# Patient Record
Sex: FEMALE | Race: WHITE | Employment: OTHER | URBAN - NONMETROPOLITAN AREA
[De-identification: names, ages, dates, MRNs, and addresses within clinical notes are randomized per-mention and may not be internally consistent; named-entity substitution may affect disease eponyms.]

---

## 2021-01-19 ENCOUNTER — HOSPITAL ENCOUNTER (EMERGENCY)
Age: 26
Discharge: HOME | End: 2021-01-19
Payer: MEDICAID

## 2021-01-19 VITALS
RESPIRATION RATE: 16 BRPM | WEIGHT: 113 LBS | OXYGEN SATURATION: 100 % | HEIGHT: 70 IN | HEART RATE: 54 BPM | DIASTOLIC BLOOD PRESSURE: 74 MMHG | TEMPERATURE: 98.1 F | SYSTOLIC BLOOD PRESSURE: 108 MMHG | BODY MASS INDEX: 16.18 KG/M2

## 2021-01-19 DIAGNOSIS — Z20.2 EXPOSURE TO STD: Primary | ICD-10-CM

## 2021-01-19 LAB
BACTERIA: NORMAL
BILIRUBIN URINE: NEGATIVE
BLOOD, URINE: NEGATIVE
CASTS: NORMAL /LPF
CASTS: NORMAL /LPF
CHARACTER, URINE: CLEAR
COLOR: YELLOW
CRYSTALS: NORMAL
EPITHELIAL CELLS, UA: NORMAL /HPF
GLUCOSE, URINE: NEGATIVE MG/DL
KETONES, URINE: NEGATIVE
KOH PREP: NORMAL
LEUKOCYTE ESTERASE, URINE: NEGATIVE
MISCELLANEOUS LAB TEST RESULT: NORMAL
NITRITE, URINE: NEGATIVE
PH UA: 7 (ref 5–9)
PREGNANCY, URINE: NEGATIVE
PROTEIN UA: NEGATIVE MG/DL
RBC URINE: NORMAL /HPF
RENAL EPITHELIAL, UA: NORMAL
SPECIFIC GRAVITY UA: 1.02 (ref 1–1.03)
TRICHOMONAS PREP: NORMAL
UROBILINOGEN, URINE: 1 EU/DL (ref 0–1)
WBC UA: NORMAL /HPF
YEAST: NORMAL

## 2021-01-19 PROCEDURE — 87205 SMEAR GRAM STAIN: CPT

## 2021-01-19 PROCEDURE — 81025 URINE PREGNANCY TEST: CPT

## 2021-01-19 PROCEDURE — 87591 N.GONORRHOEAE DNA AMP PROB: CPT

## 2021-01-19 PROCEDURE — 99284 EMERGENCY DEPT VISIT MOD MDM: CPT

## 2021-01-19 PROCEDURE — 87070 CULTURE OTHR SPECIMN AEROBIC: CPT

## 2021-01-19 PROCEDURE — 87220 TISSUE EXAM FOR FUNGI: CPT

## 2021-01-19 PROCEDURE — 81001 URINALYSIS AUTO W/SCOPE: CPT

## 2021-01-19 PROCEDURE — 87210 SMEAR WET MOUNT SALINE/INK: CPT

## 2021-01-19 PROCEDURE — 6360000002 HC RX W HCPCS: Performed by: PHYSICIAN ASSISTANT

## 2021-01-19 PROCEDURE — 96372 THER/PROPH/DIAG INJ SC/IM: CPT

## 2021-01-19 PROCEDURE — 87491 CHLMYD TRACH DNA AMP PROBE: CPT

## 2021-01-19 PROCEDURE — 6370000000 HC RX 637 (ALT 250 FOR IP): Performed by: PHYSICIAN ASSISTANT

## 2021-01-19 PROCEDURE — 2500000003 HC RX 250 WO HCPCS: Performed by: PHYSICIAN ASSISTANT

## 2021-01-19 RX ORDER — AZITHROMYCIN 250 MG/1
1000 TABLET, FILM COATED ORAL ONCE
Status: COMPLETED | OUTPATIENT
Start: 2021-01-19 | End: 2021-01-19

## 2021-01-19 RX ORDER — ALBUTEROL SULFATE 0.63 MG/3ML
1 SOLUTION RESPIRATORY (INHALATION) EVERY 6 HOURS PRN
COMMUNITY
End: 2021-11-08 | Stop reason: SDUPTHER

## 2021-01-19 RX ORDER — METRONIDAZOLE 500 MG/1
500 TABLET ORAL 2 TIMES DAILY
Qty: 14 TABLET | Refills: 0 | Status: SHIPPED | OUTPATIENT
Start: 2021-01-19 | End: 2021-01-26

## 2021-01-19 RX ORDER — ALBUTEROL SULFATE 90 UG/1
2 AEROSOL, METERED RESPIRATORY (INHALATION) EVERY 6 HOURS PRN
COMMUNITY
End: 2021-11-08 | Stop reason: SDUPTHER

## 2021-01-19 RX ADMIN — AZITHROMYCIN 1000 MG: 250 TABLET, FILM COATED ORAL at 16:11

## 2021-01-19 RX ADMIN — LIDOCAINE HYDROCHLORIDE 250 MG: 10 INJECTION, SOLUTION EPIDURAL; INFILTRATION; INTRACAUDAL; PERINEURAL at 16:12

## 2021-01-19 SDOH — HEALTH STABILITY: MENTAL HEALTH: HOW OFTEN DO YOU HAVE A DRINK CONTAINING ALCOHOL?: NEVER

## 2021-01-19 ASSESSMENT — ENCOUNTER SYMPTOMS
ABDOMINAL PAIN: 0
RHINORRHEA: 0
BACK PAIN: 0
SHORTNESS OF BREATH: 0
SORE THROAT: 0
VOMITING: 0
DIARRHEA: 0
NAUSEA: 0
COUGH: 0

## 2021-01-19 ASSESSMENT — PAIN DESCRIPTION - PAIN TYPE: TYPE: ACUTE PAIN

## 2021-01-19 ASSESSMENT — PAIN DESCRIPTION - LOCATION: LOCATION: ABDOMEN

## 2021-01-19 ASSESSMENT — PAIN DESCRIPTION - ORIENTATION: ORIENTATION: LOWER

## 2021-01-19 ASSESSMENT — PAIN DESCRIPTION - INTENSITY: RATING_2: 8

## 2021-01-19 ASSESSMENT — PAIN SCALES - GENERAL
PAINLEVEL_OUTOF10: 5
PAINLEVEL_OUTOF10: 8

## 2021-01-19 NOTE — ED PROVIDER NOTES
[facility] EMERGENCY DEPT      CHIEF COMPLAINT       Chief Complaint   Patient presents with    Abdominal Pain     known exposure to STD    Flank Pain     right       Nurses Notes reviewed and I agree except as noted in the HPI. HISTORY OF PRESENT ILLNESS    Flaco Matamoros is a 30 y.o. female who presents for pelvic pain and STI testing. Patient reports that she recently moved her from [place] where she was sexually active with a female and male. She notes that the female called her yesterday and told her that she tested positive for Trichomonas and chlamydia. Patient states that she has had pelvic pain since before her last period which began on . She states that she originally thought the pain was due to her upcoming period but that the pelvic pain persisted. She notes that she has noticed more vaginal discharge than usual as well. She also notes a 13 lb unexpected weight loss in the past two months stating that she has had a decreased appetite. She notes that she had an episode of emesis about a week ago but feels it was secondary to anxiety which she experiences daily. Patient denies chance of pregnancy. She is A2. Denies fever, chills, nausea, emesis, dysuria, urinary frequency, or hematuria. No further concerns noted. REVIEW OF SYSTEMS     Review of Systems   Constitutional: Positive for unexpected weight change (13 lb). Negative for appetite change, chills and fever. HENT: Negative for congestion, ear pain, rhinorrhea and sore throat. Respiratory: Negative for cough and shortness of breath. Cardiovascular: Negative for chest pain. Gastrointestinal: Negative for abdominal pain (RLQ and LLQ), diarrhea, nausea and vomiting. Genitourinary: Positive for pelvic pain and vaginal discharge. Negative for difficulty urinating, dysuria, flank pain, frequency, urgency, vaginal bleeding and vaginal pain. Musculoskeletal: Negative for back pain and gait problem.    Skin: Negative for rash. Neurological: Negative for dizziness, weakness and numbness. Psychiatric/Behavioral: Negative for confusion, sleep disturbance and suicidal ideas. The patient is nervous/anxious. PAST MEDICAL HISTORY    has a past medical history of Anxiety and Asthma. SURGICAL HISTORY      has no past surgical history on file. CURRENT MEDICATIONS       Discharge Medication List as of 1/19/2021  5:26 PM      CONTINUE these medications which have NOT CHANGED    Details   albuterol (ACCUNEB) 0.63 MG/3ML nebulizer solution Take 1 ampule by nebulization every 6 hours as needed for WheezingHistorical Med      albuterol sulfate HFA (VENTOLIN HFA) 108 (90 Base) MCG/ACT inhaler Inhale 2 puffs into the lungs every 6 hours as needed for [address]     has No Known Allergies. FAMILY HISTORY     has no family status information on file. family history is not on file. SOCIAL HISTORY    reports that she has been smoking e-cigarettes. She has never used smokeless tobacco. She reports previous drug use. Drug: Marijuana. She reports that she does not drink alcohol. PHYSICAL EXAM     INITIAL VITALS:  height is 5' 10\" (1.778 m) and weight is 113 lb (51.3 kg). Her oral temperature is 98.1 °F (36.7 °C). Her blood pressure is 108/74 and her pulse is 54. Her respiration is 16 and oxygen saturation is 100%. Physical Exam  Vitals signs and nursing note reviewed. Exam conducted with a chaperone present. Constitutional:       General: She is not in acute distress. Appearance: Normal appearance. She is well-developed and underweight. She is not toxic-appearing or diaphoretic. HENT:      Head: Normocephalic and atraumatic. Right Ear: Hearing normal.      Left Ear: Hearing normal.      Nose: Nose normal. No rhinorrhea. Mouth/Throat:      Lips: Pink. Mouth: Mucous membranes are moist.      Pharynx: Uvula midline.    Eyes:      General: Lids are normal. No scleral icterus. Extraocular Movements: Extraocular movements intact. Conjunctiva/sclera: Conjunctivae normal.      Pupils: Pupils are equal, round, and reactive to light. Neck:      Musculoskeletal: No neck rigidity. Vascular: No JVD. Trachea: Trachea normal. No tracheal deviation. Cardiovascular:      Rate and Rhythm: Normal rate and regular rhythm. Heart sounds: Normal heart sounds. Pulmonary:      Effort: Pulmonary effort is normal. No respiratory distress. Breath sounds: Normal breath sounds. No decreased breath sounds or wheezing. Abdominal:      General: Bowel sounds are normal. There is no distension. Palpations: Abdomen is soft. Abdomen is not rigid. There is no pulsatile mass. Tenderness: There is abdominal tenderness in the suprapubic area. There is no right CVA tenderness, left CVA tenderness, guarding or rebound. Negative signs include Bean's sign and McBurney's sign. Hernia: No hernia is present. Genitourinary:     Exam position: Lithotomy position. Labia:         Right: No rash, tenderness or lesion. Left: No rash, tenderness or lesion. Vagina: Normal.      Cervix: Discharge present. No cervical motion tenderness, lesion or cervical bleeding. Uterus: Normal.    Musculoskeletal: Normal range of motion. Comments: Movement normal as observed   Lymphadenopathy:      Cervical: No cervical adenopathy. Skin:     General: Skin is warm and dry. Coloration: Skin is not pale. Findings: No rash. Neurological:      General: No focal deficit present. Mental Status: She is alert and oriented to person, place, and time. Gait: Gait normal.   Psychiatric:         Mood and Affect: Mood normal.         Speech: Speech normal.         Behavior: Behavior normal.         Thought Content:  Thought content normal.         Cognition and Memory: Cognition normal.         DIFFERENTIAL DIAGNOSIS:   Including but not limited distress evident in the ER. She ate a boxed lunch. The patient was comfortable with the plan of discharge home and to follow up her PCP. Anticipatory guidance was given. The patient was instructed to return to the emergency department for any worsening of their symptoms. Patient was discharged from the emergency department in good condition with all questions answered. See disposition below. I have given the patient strict written and verbal instructions about care at home, follow-up, and signs and symptoms of worsening of condition and they did verbalize understanding. CRITICAL CARE:   None    CONSULTS:  None    PROCEDURES:  None    FINAL IMPRESSION      1. Exposure to STD          DISPOSITION/PLAN     1.  Exposure to STD        PATIENT REFERRED TO:  [facility] Department  [address] [facility] [address]  [phone]    Schedule an appointment as soon as possible for a visit   For full STD panel including HIV, syphilis, and hepatitis C      DISCHARGE MEDICATIONS:  Discharge Medication List as of 1/19/2021  5:26 PM      START taking these medications    Details   metroNIDAZOLE (FLAGYL) 500 MG tablet Take 1 tablet by mouth 2 times daily for 7 days, Disp-14 tablet, R-0Print             (Please note that portions of this note were completed with a voice recognition program.  Efforts were made to edit the dictations but occasionally words are mis-transcribed.)    Joyce Mary PA-C 01/21/21 9:12 AM    BERTRAND Agudelo PA-C  01/21/21 9022

## 2021-01-19 NOTE — ED NOTES
Patient presents to ED with complaint of exposure to STD with sexual partner reporting trich and chlamydia. Patient states she has had intermittent lower abdominal pain and right flank pain for the past two weeks. Patient states increased vaginal discharge.      Shania Dewey RN  01/19/21 [id number]

## 2021-01-22 LAB
CHLAMYDIA TRACHOMATIS AMPLIFIED DET: NEGATIVE
GENITAL CULTURE, ROUTINE: NORMAL
GRAM STAIN RESULT: NORMAL
NEISSERIA GONORRHOEAE BY AMP: NEGATIVE
SPECIMEN SOURCE: NORMAL

## 2021-01-23 NOTE — PROGRESS NOTES
Pharmacy Note  ED Culture Follow-up    Jose Marti is a 30 y.o. female. Allergies: Patient has no known allergies. Labs:  No results found for: BUN, CREATININE, WBC  CrCl cannot be calculated (No successful lab value found. ). Current antimicrobials:   Metronidazole      ASSESSMENT:  Micro results:   Genital culture: positive for BV     PLAN:  Need for intervention: No  Discussed with: BRENNA Motta  Chosen treatment:    Patient already on appropriate treatment as above    Patient response:   No need to contact patient    Called/sent in prescription to: Not applicable    Please call with any questions.  John Ellis, PharmD 3:44 PM 1/23/2021 Hydroxychloroquine, Norvasc, Lipitor, Lantus, Humalog, Thiamine, Vit C, Solumedrol

## 2021-04-15 ENCOUNTER — APPOINTMENT (OUTPATIENT)
Dept: CT IMAGING | Age: 26
End: 2021-04-15
Payer: MEDICAID

## 2021-04-15 ENCOUNTER — HOSPITAL ENCOUNTER (EMERGENCY)
Age: 26
Discharge: HOME | End: 2021-04-15
Attending: FAMILY MEDICINE
Payer: MEDICAID

## 2021-04-15 VITALS
TEMPERATURE: 98.2 F | DIASTOLIC BLOOD PRESSURE: 70 MMHG | OXYGEN SATURATION: 97 % | HEART RATE: 61 BPM | RESPIRATION RATE: 16 BRPM | SYSTOLIC BLOOD PRESSURE: 108 MMHG

## 2021-04-15 DIAGNOSIS — R10.9 FLANK PAIN: Primary | ICD-10-CM

## 2021-04-15 DIAGNOSIS — R10.9 RIGHT FLANK PAIN: ICD-10-CM

## 2021-04-15 LAB
ALBUMIN SERPL-MCNC: 4 G/DL (ref 3.5–5.1)
ALP BLD-CCNC: 58 U/L (ref 38–126)
ALT SERPL-CCNC: 14 U/L (ref 11–66)
ANION GAP SERPL CALCULATED.3IONS-SCNC: 10 MEQ/L (ref 8–16)
AST SERPL-CCNC: 21 U/L (ref 5–40)
BACTERIA: ABNORMAL
BASOPHILS # BLD: 0.2 %
BASOPHILS ABSOLUTE: 0 THOU/MM3 (ref 0–0.1)
BILIRUB SERPL-MCNC: 0.6 MG/DL (ref 0.3–1.2)
BILIRUBIN URINE: NEGATIVE
BLOOD, URINE: NEGATIVE
BUN BLDV-MCNC: 17 MG/DL (ref 7–22)
CALCIUM SERPL-MCNC: 9.3 MG/DL (ref 8.5–10.5)
CASTS: ABNORMAL /LPF
CASTS: ABNORMAL /LPF
CHARACTER, URINE: CLEAR
CHLORIDE BLD-SCNC: 103 MEQ/L (ref 98–111)
CO2: 25 MEQ/L (ref 23–33)
COLOR: YELLOW
CREAT SERPL-MCNC: 0.8 MG/DL (ref 0.4–1.2)
CRYSTALS: ABNORMAL
EOSINOPHIL # BLD: 0.9 %
EOSINOPHILS ABSOLUTE: 0.1 THOU/MM3 (ref 0–0.4)
EPITHELIAL CELLS, UA: ABNORMAL /HPF
ERYTHROCYTE [DISTWIDTH] IN BLOOD BY AUTOMATED COUNT: 13.4 % (ref 11.5–14.5)
ERYTHROCYTE [DISTWIDTH] IN BLOOD BY AUTOMATED COUNT: 44 FL (ref 35–45)
GFR SERPL CREATININE-BSD FRML MDRD: > 90 ML/MIN/1.73M2
GLUCOSE BLD-MCNC: 92 MG/DL (ref 70–108)
GLUCOSE, URINE: NEGATIVE MG/DL
HCT VFR BLD CALC: 41.9 % (ref 37–47)
HEMOGLOBIN: 13.6 GM/DL (ref 12–16)
IMMATURE GRANS (ABS): 0.03 THOU/MM3 (ref 0–0.07)
IMMATURE GRANULOCYTES: 0.2 %
KETONES, URINE: NEGATIVE
LEUKOCYTE EST, POC: ABNORMAL
LYMPHOCYTES # BLD: 11.6 %
LYMPHOCYTES ABSOLUTE: 1.5 THOU/MM3 (ref 1–4.8)
MCH RBC QN AUTO: 28.9 PG (ref 26–33)
MCHC RBC AUTO-ENTMCNC: 32.5 GM/DL (ref 32.2–35.5)
MCV RBC AUTO: 89.1 FL (ref 81–99)
MISCELLANEOUS LAB TEST RESULT: ABNORMAL
MONOCYTES # BLD: 5.5 %
MONOCYTES ABSOLUTE: 0.7 THOU/MM3 (ref 0.4–1.3)
NITRITE, URINE: NEGATIVE
NUCLEATED RED BLOOD CELLS: 0 /100 WBC
OSMOLALITY CALCULATION: 276.9 MOSMOL/KG (ref 275–300)
PH UA: 7 (ref 5–9)
PLATELET # BLD: 266 THOU/MM3 (ref 130–400)
PMV BLD AUTO: 10.4 FL (ref 9.4–12.4)
POTASSIUM REFLEX MAGNESIUM: 4.3 MEQ/L (ref 3.5–5.2)
PREGNANCY, URINE: NEGATIVE
PROTEIN UA: NEGATIVE MG/DL
RBC # BLD: 4.7 MILL/MM3 (ref 4.2–5.4)
RBC URINE: ABNORMAL /HPF
RENAL EPITHELIAL, UA: ABNORMAL
SEG NEUTROPHILS: 81.6 %
SEGMENTED NEUTROPHILS ABSOLUTE COUNT: 10.2 THOU/MM3 (ref 1.8–7.7)
SODIUM BLD-SCNC: 138 MEQ/L (ref 135–145)
SPECIFIC GRAVITY UA: 1.01 (ref 1–1.03)
TOTAL PROTEIN: 7.1 G/DL (ref 6.1–8)
UROBILINOGEN, URINE: 0.2 EU/DL (ref 0–1)
WBC # BLD: 12.5 THOU/MM3 (ref 4.8–10.8)
WBC UA: ABNORMAL /HPF
YEAST: ABNORMAL

## 2021-04-15 PROCEDURE — 36415 COLL VENOUS BLD VENIPUNCTURE: CPT

## 2021-04-15 PROCEDURE — 96375 TX/PRO/DX INJ NEW DRUG ADDON: CPT

## 2021-04-15 PROCEDURE — 99284 EMERGENCY DEPT VISIT MOD MDM: CPT

## 2021-04-15 PROCEDURE — 81001 URINALYSIS AUTO W/SCOPE: CPT

## 2021-04-15 PROCEDURE — 96374 THER/PROPH/DIAG INJ IV PUSH: CPT

## 2021-04-15 PROCEDURE — 80053 COMPREHEN METABOLIC PANEL: CPT

## 2021-04-15 PROCEDURE — 2580000003 HC RX 258: Performed by: FAMILY MEDICINE

## 2021-04-15 PROCEDURE — 85025 COMPLETE CBC W/AUTO DIFF WBC: CPT

## 2021-04-15 PROCEDURE — 74176 CT ABD & PELVIS W/O CONTRAST: CPT

## 2021-04-15 PROCEDURE — 81025 URINE PREGNANCY TEST: CPT

## 2021-04-15 PROCEDURE — 6360000002 HC RX W HCPCS: Performed by: FAMILY MEDICINE

## 2021-04-15 RX ORDER — KETOROLAC TROMETHAMINE 30 MG/ML
30 INJECTION, SOLUTION INTRAMUSCULAR; INTRAVENOUS ONCE
Status: COMPLETED | OUTPATIENT
Start: 2021-04-15 | End: 2021-04-15

## 2021-04-15 RX ORDER — MORPHINE SULFATE 2 MG/ML
2 INJECTION, SOLUTION INTRAMUSCULAR; INTRAVENOUS ONCE
Status: COMPLETED | OUTPATIENT
Start: 2021-04-15 | End: 2021-04-15

## 2021-04-15 RX ORDER — 0.9 % SODIUM CHLORIDE 0.9 %
1000 INTRAVENOUS SOLUTION INTRAVENOUS ONCE
Status: COMPLETED | OUTPATIENT
Start: 2021-04-15 | End: 2021-04-15

## 2021-04-15 RX ADMIN — KETOROLAC TROMETHAMINE 30 MG: 30 INJECTION, SOLUTION INTRAMUSCULAR at 11:21

## 2021-04-15 RX ADMIN — SODIUM CHLORIDE 1000 ML: 9 INJECTION, SOLUTION INTRAVENOUS at 11:30

## 2021-04-15 RX ADMIN — MORPHINE SULFATE 2 MG: 2 INJECTION, SOLUTION INTRAMUSCULAR; INTRAVENOUS at 11:21

## 2021-04-15 NOTE — ED TRIAGE NOTES
Patient presents with concerns of right flank pain and mid lower abdominal cramping. States she is at the end of her period and her pain started yesterday. States she has not thrown up and does not feel nauseated.  States she did have an episode of diarrhea yesterday

## 2021-04-15 NOTE — ED NOTES
Bedside shift report given to this RN at this time by Travis Abdul RN. Patient is up in bed and updated on plan of care at this time. Patient is alert and oriented x4 and respirations are regular and unlabored. Call light within reach       Lashay Baig RN  04/15/21

## 2021-04-15 NOTE — ED PROVIDER NOTES
EMERGENCY DEPARTMENT ENCOUNTER     CHIEF COMPLAINT   Chief Complaint   Patient presents with    Flank Pain    Abdominal Pain        HPI   Saloni Yusuf is a 30 y.o. female with prior hx of 2 vaginal births but no abdominal surgeries, who presents with one day of right-sided abdominal pain, onset was this morning. The duration has been constant since the onset. The patient has associated right flank pain with no obvious fever, vomiting or hematuria. There are no alleviating factors. The context is that the symptoms started spontaneously, without any known precipitants. REVIEW OF SYSTEMS   GI: See history of present illness   Cardiac: No chest pain or syncope   Pulmonary: No difficulty breathing or new cough   General: No fevers   : +right flank pain; No hematuria or dysuria   See HPI for further details. All other review of systems are reviewed and are otherwise negative. PAST MEDICAL & SURGICAL HISTORY   Past Medical History:   Diagnosis Date    Anxiety     Asthma       History reviewed. No pertinent surgical history.      CURRENT MEDICATIONS   Current Outpatient Rx   Medication Sig Dispense Refill    diclofenac (VOLTAREN) 50 MG EC tablet Take 1 tablet by mouth 2 times daily as needed for Pain 20 tablet 0    albuterol (ACCUNEB) 0.63 MG/3ML nebulizer solution Take 1 ampule by nebulization every 6 hours as needed for Wheezing      albuterol sulfate HFA (VENTOLIN HFA) 108 (90 Base) MCG/ACT inhaler Inhale 2 puffs into the lungs every 6 hours as needed for Wheezing          ALLERGIES   No Known Allergies     SOCIAL AND FAMILY HISTORY   Social History     Socioeconomic History    Marital status: Single     Spouse name: None    Number of children: None    Years of education: None    Highest education level: None   Occupational History    None   Social Needs    Financial resource strain: None    Food insecurity     Worry: None     Inability: None    Transportation needs     Medical: None Non-medical: None   Tobacco Use    Smoking status: Current Every Day Smoker     Types: E-Cigarettes    Smokeless tobacco: Never Used   Substance and Sexual Activity    Alcohol use: Never     Frequency: Never    Drug use: Not Currently     Types: Marijuana    Sexual activity: Yes     Partners: Male, Female   Lifestyle    Physical activity     Days per week: None     Minutes per session: None    Stress: None   Relationships    Social connections     Talks on phone: None     Gets together: None     Attends Mandaeism service: None     Active member of club or organization: None     Attends meetings of clubs or organizations: None     Relationship status: None    Intimate partner violence     Fear of current or ex partner: None     Emotionally abused: None     Physically abused: None     Forced sexual activity: None   Other Topics Concern    None   Social History Narrative    None      History reviewed. No pertinent family history. PHYSICAL EXAM   VITAL SIGNS: /70   Pulse 61   Temp 98.2 °F (36.8 °C)   Resp 16   SpO2 97%    Constitutional: Well developed, well nourished, mild acute distress   Eyes: Sclera nonicteric, conjunctiva moist   HENT: Atraumatic, nose normal   Neck: Supple, no JVD   Respiratory: No retractions, no accessory muscle use, normal breath sounds   Cardiovascular: Normal rate, normal rhythm, no murmurs   GI: Soft, no abdominal tenderness, no guarding, bowel sounds present, no audible bruits or palpable pulsatile masses   Musculoskeletal: +right CVA tenderness to palpation without any overlying rash; No edema, no deformity   Vascular: DP pulses 2+ equal bilaterally   Integument: No rash, dry skin   Neurologic: Alert & oriented, normal speech   Psychiatric: Cooperative, pleasant affect     RADIOLOGY/PROCEDURES   CT ABDOMEN PELVIS WO CONTRAST Additional Contrast? None   Final Result   1. Moderate distention of the urinary bladder. 2. Mild constipation.             **This report has

## 2021-04-15 NOTE — ED NOTES
Pt up in bed with blankets over bottom half. Patient updated on plan of care at this time and expresses no concerns regarding treatment. Patient VSS. Respirations are regular and unlabored, patient is alert and oriented x4. Patient bed rails up x2 and call light within reach. Will continue to monitor.        Prabhakar Lott RN  04/15/21 4514

## 2021-04-15 NOTE — LETTER
[facility] [address] [address] EMERGENCY DEPT  [address] [place] [address]  Phone: [phone]             April 15, 2021    Patient: Ara Davey   YOB: 1995   Date of Visit: 4/15/2021       To Whom It May Concern:    Please excuse Kristopher Jolley from work today as he was accompanying a friend to the Emergency Department. He may return to work on 4/16/21 (off 4/15/21).       Sincerely,             Signature:__________________________________

## 2021-04-15 NOTE — ED NOTES
Patient lying in bed with blankets watching tv at this time. Patient respirations are regular and unlabored. Patient appears to be in no current distress. Patient VSS. Call light is within reach. Patient expresses no needs at this time.        Deb Holley RN  04/15/21 [id number]

## 2021-04-20 ENCOUNTER — APPOINTMENT (OUTPATIENT)
Dept: CT IMAGING | Age: 26
End: 2021-04-20
Payer: MEDICAID

## 2021-04-20 ENCOUNTER — HOSPITAL ENCOUNTER (EMERGENCY)
Age: 26
Discharge: HOME | End: 2021-04-20
Attending: EMERGENCY MEDICINE
Payer: MEDICAID

## 2021-04-20 VITALS
TEMPERATURE: 97.4 F | DIASTOLIC BLOOD PRESSURE: 60 MMHG | HEART RATE: 79 BPM | RESPIRATION RATE: 17 BRPM | SYSTOLIC BLOOD PRESSURE: 91 MMHG | WEIGHT: 113 LBS | BODY MASS INDEX: 16.21 KG/M2 | OXYGEN SATURATION: 100 %

## 2021-04-20 DIAGNOSIS — N72 CERVICITIS: ICD-10-CM

## 2021-04-20 DIAGNOSIS — N34.2 URETHRITIS: ICD-10-CM

## 2021-04-20 DIAGNOSIS — R10.31 RIGHT LOWER QUADRANT ABDOMINAL PAIN: Primary | ICD-10-CM

## 2021-04-20 LAB
ALBUMIN SERPL-MCNC: 4 G/DL (ref 3.5–5.1)
ALP BLD-CCNC: 57 U/L (ref 38–126)
ALT SERPL-CCNC: 15 U/L (ref 11–66)
ANION GAP SERPL CALCULATED.3IONS-SCNC: 7 MEQ/L (ref 8–16)
AST SERPL-CCNC: 15 U/L (ref 5–40)
BASOPHILS # BLD: 0.7 %
BASOPHILS ABSOLUTE: 0 THOU/MM3 (ref 0–0.1)
BILIRUB SERPL-MCNC: < 0.2 MG/DL (ref 0.3–1.2)
BILIRUBIN DIRECT: < 0.2 MG/DL (ref 0–0.3)
BILIRUBIN URINE: NEGATIVE
BLOOD, URINE: NEGATIVE
BUN BLDV-MCNC: 15 MG/DL (ref 7–22)
CALCIUM SERPL-MCNC: 9.7 MG/DL (ref 8.5–10.5)
CHARACTER, URINE: CLEAR
CHLORIDE BLD-SCNC: 104 MEQ/L (ref 98–111)
CO2: 27 MEQ/L (ref 23–33)
COLOR: YELLOW
CREAT SERPL-MCNC: 0.8 MG/DL (ref 0.4–1.2)
EOSINOPHIL # BLD: 3 %
EOSINOPHILS ABSOLUTE: 0.1 THOU/MM3 (ref 0–0.4)
ERYTHROCYTE [DISTWIDTH] IN BLOOD BY AUTOMATED COUNT: 13.2 % (ref 11.5–14.5)
ERYTHROCYTE [DISTWIDTH] IN BLOOD BY AUTOMATED COUNT: 42.9 FL (ref 35–45)
GFR SERPL CREATININE-BSD FRML MDRD: > 90 ML/MIN/1.73M2
GLUCOSE BLD-MCNC: 97 MG/DL (ref 70–108)
GLUCOSE URINE: NEGATIVE MG/DL
HCT VFR BLD CALC: 44.2 % (ref 37–47)
HEMOGLOBIN: 14.1 GM/DL (ref 12–16)
IMMATURE GRANS (ABS): 0.02 THOU/MM3 (ref 0–0.07)
IMMATURE GRANULOCYTES: 0.5 %
KETONES, URINE: NEGATIVE
KOH PREP: NORMAL
LEUKOCYTE ESTERASE, URINE: NEGATIVE
LIPASE: 35.9 U/L (ref 5.6–51.3)
LYMPHOCYTES # BLD: 30.7 %
LYMPHOCYTES ABSOLUTE: 1.4 THOU/MM3 (ref 1–4.8)
MCH RBC QN AUTO: 28.5 PG (ref 26–33)
MCHC RBC AUTO-ENTMCNC: 31.9 GM/DL (ref 32.2–35.5)
MCV RBC AUTO: 89.3 FL (ref 81–99)
MONOCYTES # BLD: 5.3 %
MONOCYTES ABSOLUTE: 0.2 THOU/MM3 (ref 0.4–1.3)
NITRITE, URINE: NEGATIVE
NUCLEATED RED BLOOD CELLS: 0 /100 WBC
OSMOLALITY CALCULATION: 276.4 MOSMOL/KG (ref 275–300)
PH UA: 6.5 (ref 5–9)
PLATELET # BLD: 321 THOU/MM3 (ref 130–400)
PMV BLD AUTO: 9.6 FL (ref 9.4–12.4)
POTASSIUM REFLEX MAGNESIUM: 5.1 MEQ/L (ref 3.5–5.2)
PREGNANCY, URINE: NEGATIVE
PROTEIN UA: NEGATIVE
RBC # BLD: 4.95 MILL/MM3 (ref 4.2–5.4)
SEG NEUTROPHILS: 59.8 %
SEGMENTED NEUTROPHILS ABSOLUTE COUNT: 2.6 THOU/MM3 (ref 1.8–7.7)
SODIUM BLD-SCNC: 138 MEQ/L (ref 135–145)
SPECIFIC GRAVITY, URINE: 1.01 (ref 1–1.03)
TOTAL PROTEIN: 7.1 G/DL (ref 6.1–8)
TRICHOMONAS PREP: NORMAL
UROBILINOGEN, URINE: 0.2 EU/DL (ref 0–1)
WBC # BLD: 4.4 THOU/MM3 (ref 4.8–10.8)

## 2021-04-20 PROCEDURE — 87220 TISSUE EXAM FOR FUNGI: CPT

## 2021-04-20 PROCEDURE — 6360000002 HC RX W HCPCS: Performed by: STUDENT IN AN ORGANIZED HEALTH CARE EDUCATION/TRAINING PROGRAM

## 2021-04-20 PROCEDURE — 87491 CHLMYD TRACH DNA AMP PROBE: CPT

## 2021-04-20 PROCEDURE — 87591 N.GONORRHOEAE DNA AMP PROB: CPT

## 2021-04-20 PROCEDURE — 83690 ASSAY OF LIPASE: CPT

## 2021-04-20 PROCEDURE — 81025 URINE PREGNANCY TEST: CPT

## 2021-04-20 PROCEDURE — 96365 THER/PROPH/DIAG IV INF INIT: CPT

## 2021-04-20 PROCEDURE — 85025 COMPLETE CBC W/AUTO DIFF WBC: CPT

## 2021-04-20 PROCEDURE — 81003 URINALYSIS AUTO W/O SCOPE: CPT

## 2021-04-20 PROCEDURE — 2580000003 HC RX 258: Performed by: STUDENT IN AN ORGANIZED HEALTH CARE EDUCATION/TRAINING PROGRAM

## 2021-04-20 PROCEDURE — 6360000004 HC RX CONTRAST MEDICATION: Performed by: STUDENT IN AN ORGANIZED HEALTH CARE EDUCATION/TRAINING PROGRAM

## 2021-04-20 PROCEDURE — 80076 HEPATIC FUNCTION PANEL: CPT

## 2021-04-20 PROCEDURE — 99283 EMERGENCY DEPT VISIT LOW MDM: CPT

## 2021-04-20 PROCEDURE — 87210 SMEAR WET MOUNT SALINE/INK: CPT

## 2021-04-20 PROCEDURE — 96375 TX/PRO/DX INJ NEW DRUG ADDON: CPT

## 2021-04-20 PROCEDURE — 80048 BASIC METABOLIC PNL TOTAL CA: CPT

## 2021-04-20 PROCEDURE — 6370000000 HC RX 637 (ALT 250 FOR IP): Performed by: STUDENT IN AN ORGANIZED HEALTH CARE EDUCATION/TRAINING PROGRAM

## 2021-04-20 PROCEDURE — 36415 COLL VENOUS BLD VENIPUNCTURE: CPT

## 2021-04-20 PROCEDURE — 74177 CT ABD & PELVIS W/CONTRAST: CPT

## 2021-04-20 RX ORDER — AZITHROMYCIN 250 MG/1
500 TABLET, FILM COATED ORAL ONCE
Status: COMPLETED | OUTPATIENT
Start: 2021-04-20 | End: 2021-04-20

## 2021-04-20 RX ORDER — KETOROLAC TROMETHAMINE 30 MG/ML
30 INJECTION, SOLUTION INTRAMUSCULAR; INTRAVENOUS ONCE
Status: COMPLETED | OUTPATIENT
Start: 2021-04-20 | End: 2021-04-20

## 2021-04-20 RX ORDER — DOXYCYCLINE HYCLATE 100 MG
100 TABLET ORAL 2 TIMES DAILY
Qty: 20 TABLET | Refills: 0 | Status: SHIPPED | OUTPATIENT
Start: 2021-04-20 | End: 2021-04-30

## 2021-04-20 RX ORDER — ONDANSETRON 2 MG/ML
4 INJECTION INTRAMUSCULAR; INTRAVENOUS ONCE
Status: COMPLETED | OUTPATIENT
Start: 2021-04-20 | End: 2021-04-20

## 2021-04-20 RX ADMIN — CEFTRIAXONE SODIUM 500 MG: 500 INJECTION, POWDER, FOR SOLUTION INTRAMUSCULAR; INTRAVENOUS at 14:52

## 2021-04-20 RX ADMIN — AZITHROMYCIN MONOHYDRATE 500 MG: 250 TABLET ORAL at 14:53

## 2021-04-20 RX ADMIN — IOPAMIDOL 80 ML: 755 INJECTION, SOLUTION INTRAVENOUS at 12:03

## 2021-04-20 RX ADMIN — KETOROLAC TROMETHAMINE 30 MG: 30 INJECTION, SOLUTION INTRAMUSCULAR at 10:17

## 2021-04-20 RX ADMIN — ONDANSETRON 4 MG: 2 INJECTION INTRAMUSCULAR; INTRAVENOUS at 10:17

## 2021-04-20 ASSESSMENT — ENCOUNTER SYMPTOMS
ABDOMINAL PAIN: 1
COUGH: 0
NAUSEA: 0
RHINORRHEA: 0
CONSTIPATION: 1
BACK PAIN: 0
VOMITING: 0
DIARRHEA: 0
EYE REDNESS: 0
BLOOD IN STOOL: 0
SHORTNESS OF BREATH: 0
SINUS PAIN: 0
SORE THROAT: 0

## 2021-04-20 ASSESSMENT — PAIN SCALES - GENERAL: PAINLEVEL_OUTOF10: 7

## 2021-04-20 NOTE — LETTER
[facility] [address] [address] EMERGENCY DEPT  [address] [place] [id number]  Phone: [phone]               April 20, 2021    Patient: Airam oRbb   YOB: 1995   Date of Visit: 4/20/2021       To Whom It May Concern:    Airam Robb was seen and treated in our emergency department on 4/20/2021. She may return to work on 4/21/20.       Sincerely,     Jo Mcconnell RN        Signature:__________________________________

## 2021-04-20 NOTE — ED PROVIDER NOTES
[place] ENCOUNTER          Pt Name: Ariana Sparks  MRN: [id number]  [place] 1995  Date of evaluation: 2021  Treating Resident Physician: Natali Jay MD  Supervising Physician: Dr. Knapp Presumchepe       Chief Complaint   Patient presents with    Abdominal Pain     History obtained from the patient. HISTORY OF PRESENT ILLNESS    HPI  Ariana Sparks is a 30 y.o. female with past medical history of anxiety and asthma who presents to the emergency department for evaluation of right flank pain and right lower abdominal pain has been going on for the past 6 days. Patient was seen here on 4/15/2021 for right-sided flank pain. At that time she underwent a CT abdomen pelvis without contrast findings showed moderate distention of the urinary bladder and mild constipation. Pregnancy test at this time was negative. Her urinalysis results of the time were negative for any infection she was discharged home. Patient states is being discharged her pain initially improved however has been progressively worsening over the past few days. She also mentions having some less frequent bowel movements recently with smaller caliber size stools as well. Patient also complains of some minimal increase in her vaginal discharge with some yellowish discoloration. She has a history of gonorrhea and chlamydia with 2 prior pregnancies. She is a G4, . All vaginal deliveries. She has 1 sexual partner currently. She also describes her menstrual cycle occurring prior to her last visit stopping during the visit and then over the past 3 days having some occasional vaginal bleeding described as spotting that is currently not present today. She denies any fever, chills, nausea, vomiting, diarrhea, blood in her stool. The patient has no other acute complaints at this time.       REVIEW OF SYSTEMS   Review of Systems Constitutional: Negative for chills and fever. HENT: Negative for rhinorrhea, sinus pain and sore throat. Eyes: Negative for redness. Respiratory: Negative for cough and shortness of breath. Cardiovascular: Negative for chest pain. Gastrointestinal: Positive for abdominal pain and constipation. Negative for blood in stool, diarrhea, nausea and vomiting. Genitourinary: Positive for flank pain, vaginal bleeding and vaginal discharge. Negative for dysuria. Musculoskeletal: Negative for back pain. Skin: Negative for rash. Neurological: Negative for light-headedness and headaches. Psychiatric/Behavioral: Negative for agitation. PAST MEDICAL AND SURGICAL HISTORY     Past Medical History:   Diagnosis Date    Anxiety     Asthma      History reviewed. No pertinent surgical history.       MEDICATIONS     Current Facility-Administered Medications:     cefTRIAXone (ROCEPHIN) 500 mg in dextrose 5 % 50 mL IVPB, 500 mg, Intravenous, Once, Mila Murphy MD    [facility]) tablet 500 mg, 500 mg, Oral, Once, Mila Murphy MD    Current Outpatient Medications:     doxycycline hyclate (VIBRA-TABS) 100 MG tablet, Take 1 tablet by mouth 2 times daily for 10 days, Disp: 20 tablet, Rfl: 0    diclofenac (VOLTAREN) 50 MG EC tablet, Take 1 tablet by mouth 2 times daily as needed for Pain, Disp: 20 tablet, Rfl: 0    albuterol (ACCUNEB) 0.63 MG/3ML nebulizer solution, Take 1 ampule by nebulization every 6 hours as needed for Wheezing, Disp: , Rfl:     albuterol sulfate HFA (VENTOLIN HFA) 108 (90 Base) MCG/ACT inhaler, Inhale 2 puffs into the lungs every 6 hours as needed for Wheezing, Disp: , Rfl:       SOCIAL HISTORY     Social History     Social History Narrative    Not on file     Social History     Tobacco Use    Smoking status: Current Every Day Smoker     Types: E-Cigarettes    Smokeless tobacco: Never Used   Substance Use Topics    Alcohol use: Never     Frequency: Never    Drug use: Not Currently     Types: Marijuana         ALLERGIES   No Known Allergies      FAMILY HISTORY   History reviewed. No pertinent family history. PREVIOUS RECORDS   Previous records reviewed: Patient was seen here on 4/15/2021 for flank pain on the right side. PHYSICAL EXAM     ED Triage Vitals   BP Temp Temp src Pulse Resp SpO2 Height Weight   -- -- -- -- -- -- -- --     Initial vital signs and nursing assessment reviewed and normal. Pulsoximetry is normal per my interpretation. Additional Vital Signs:  Vitals:    04/20/21 0950   BP: (!) 124/94   Pulse: 88   Resp: 18   Temp: 97.4 °F (36.3 °C)   SpO2: 100%       Physical Exam  Vitals signs and nursing note reviewed. Constitutional:       General: She is not in acute distress. Appearance: Normal appearance. She is not toxic-appearing. HENT:      Head: Normocephalic and atraumatic. Right Ear: External ear normal.      Left Ear: External ear normal.      Nose: Nose normal.      Mouth/Throat:      Mouth: Mucous membranes are moist.      Pharynx: Oropharynx is clear. Eyes:      General: No scleral icterus. Conjunctiva/sclera: Conjunctivae normal.   Neck:      Musculoskeletal: Normal range of motion and neck supple. No neck rigidity or muscular tenderness. Cardiovascular:      Rate and Rhythm: Normal rate and regular rhythm. Pulses: Normal pulses. Heart sounds: Normal heart sounds. Pulmonary:      Effort: Pulmonary effort is normal. No respiratory distress. Breath sounds: Normal breath sounds. No wheezing. Abdominal:      General: Abdomen is flat. Bowel sounds are normal. There is no distension. Palpations: Abdomen is soft. Tenderness: There is abdominal tenderness. There is no guarding or rebound. Comments: rlq tenderness to palpation    Musculoskeletal: Normal range of motion. Lymphadenopathy:      Cervical: No cervical adenopathy. Skin:     General: Skin is warm and dry. Capillary Refill: Capillary refill takes less than 2 seconds. Coloration: Skin is not jaundiced. Neurological:      General: No focal deficit present. Mental Status: She is alert and oriented to person, place, and time. Psychiatric:         Mood and Affect: Mood normal.         Behavior: Behavior normal.         Pelvic exam: CERVIX: friable petechia noted, yellow/greenish discharge emanating from the cervix, severe cervical motion tenderness      MEDICAL DECISION MAKING   Initial Assessment: This is a 64-year-old female who is G4,  with a history of gonorrhea chlamydia during both pregnancies who presents with right flank pain over the past 6 days, she seen evaluated emergency department 5 days ago CT without contrast showed distention of the bladder mild constipation. She is discharged home after pain was adequately controlled. However pain is persistently worse over the past few days and she has some complaints of yellow-tinged vaginal discharge the past 2 days. Differential Diagnosis Included but not limited to: Urinary tract infection, ovarian cyst, intra-abdominal abscess, pyelonephritis, appendicitis, STD, cervicitis, urethritis, tubo-ovarian abscess, PID    MDM:   We will obtain laboratory studies including urinalysis as well. We will perform a pelvic exam and sent off samples for STD testing. She was given Toradol for pain as well as Zofran in case she develops nausea. ED RESULTS   Laboratory results:  Labs Reviewed   CBC WITH AUTO DIFFERENTIAL - Abnormal; Notable for the following components:       Result Value    WBC 4.4 (*)     MCHC 31.9 (*)     Monocytes Absolute 0.2 (*)     All other components within normal limits   HEPATIC FUNCTION PANEL - Abnormal; Notable for the following components:     Total Bilirubin <0.2 (*)     All other components within normal limits   ANION GAP - Abnormal; Notable for the following components:    Anion Gap 7.0 (*)     All other components within normal limits   SOFI [place])    Narrative:     Epic Plan - [id number]   WET PREP, GENITAL    Narrative:     Epic Plan - [id number]   CULTURE, HSV   C. TRACHOMATIS / N. GONORRHOEAE, DNA   BASIC METABOLIC PANEL W/ REFLEX TO MG FOR LOW K   LIPASE   URINE RT REFLEX TO CULTURE   PREGNANCY, URINE   GLOMERULAR FILTRATION RATE, ESTIMATED   OSMOLALITY       Radiologic studies results:  CT ABDOMEN PELVIS W IV CONTRAST Additional Contrast? None   Final Result    No evidence of acute intra-abdominal or intrapelvic abnormality. **This report has been created using voice recognition software. It may contain minor errors which are inherent in voice recognition technology. **      Final report electronically signed by Dr. Elvira Marcus MD on 2021 12:17 PM          ED Medications administered this visit:   Medications   cefTRIAXone (ROCEPHIN) 500 mg in dextrose 5 % 50 mL IVPB (has no administration in time range)   azithromycin (ZITHROMAX) tablet 500 mg (has no administration in time range)   ketorolac (TORADOL) injection 30 mg (30 mg Intravenous Given 21 1017)   ondansetron (ZOFRAN) injection 4 mg (4 mg Intravenous Given 21 1017)   iopamidol (ISOVUE-370) 76 % injection 80 mL (80 mLs Intravenous Given 21 1203)         ED COURSE     ED Course as of  1315   Tue 2021   1055 Within normal limits.    CBC Auto Differential(!):    WBC 4.4(!)   RBC 4.95   Hemoglobin Quant 14.1   Hematocrit 44.2   MCV 89.3   MCH 28.5   MCHC 31.9(!)   RDW-CV 13.2   RDW-SD 42.9   Platelet Count 952   MPV 9.6   Seg Neutrophils 59.8   Lymphocytes 30.7   Monocytes 5.3   Eosinophils 3.0   Basophils 0.7   Immature Granulocytes 0.5   Segs Absolute 2.6   Lymphocytes Absolute 1.4   Monocytes Absolute 0.2(!)   Eosinophils Absolute 0.1   Basophils Absolute 0.0   Immature Grans (Abs) 0.02   Nucleated Red Blood Cells 0 [AL]   1055 Anion Gap(!): 7.0 [AL]   1055 Osmolality Ca.4 [AL]   1055 Lipase: 35.9 [AL]   1055 Est, Glom Filt Rate: >90 [AL]   1055 Within normal limits. Basic Metabolic Panel w/ Reflex to MG:    Sodium 138   Potassium 5.1   Chloride 104   CO2 27   Glucose 97   BUN 15   Creatinine 0.8   Calcium 9.7 [AL]   1055 No signs of infection. Urinalysis Reflex to Culture:    Glucose, UA NEGATIVE   Bilirubin, Urine NEGATIVE   Ketones, Urine NEGATIVE   Specific Gravity, Urine 1.012   Blood, Urine NEGATIVE   pH, UA 6.5   Protein, UA NEGATIVE   Urobilinogen, Urine 0.2   Nitrite, Urine NEGATIVE   Leukocyte Esterase, Urine NEGATIVE   Color, UA YELLOW   Character, Urine CLEAR [AL]   1226 \"IMPRESSION:   No evidence of acute intra-abdominal or intrapelvic abnormality. \"   CT ABDOMEN PELVIS W IV CONTRAST Additional Contrast? None [AL]      ED Course User Index  [AL] Natali Jay MD     Strict return precautions and follow up instructions were discussed with the patient prior to discharge, with which the patient agrees. MEDICATION CHANGES     New Prescriptions    DOXYCYCLINE HYCLATE (VIBRA-TABS) 100 MG TABLET    Take 1 tablet by mouth 2 times daily for 10 days         FINAL DISPOSITION     Final diagnoses:   Right lower quadrant abdominal pain   Cervicitis   Urethritis     Condition: condition: fair  Dispo: Discharge to home      This transcription was electronically signed. Parts of this transcriptions may have been dictated by use of voice recognition software and electronically transcribed, and parts may have been transcribed with the assistance of an ED scribe. The transcription may contain errors not detected in proofreading. Please refer to my supervising physician's documentation if my documentation differs.     Electronically Signed: Natali Jay, 04/20/21, 1:15 PM         Natali Jay MD  Resident  04/20/21 0669

## 2021-04-20 NOTE — ED NOTES
Patient to the ED with complaints of abdominal pain. She states that she was seen and evaluated a few days ago for same complaint. She states that her pain has improved and states that she was having vaginal bleeding and states that this has also improved. She states that she has come back to the ED since her pain has not gone away. She denies any new nausea, vomiting, diarrhea, fever or chills. She denies any new complaints. Patient is resting in bed with easy and unlabored respirations. Call light in reach. Side rails up x2. Patient denies further complaints or concerns. Will monitor.         Dagoberto Alicia RN  04/20/21 2173

## 2021-04-20 NOTE — ED PROVIDER NOTES

## 2021-04-23 LAB
CHLAMYDIA TRACHOMATIS AMPLIFIED DET: NEGATIVE
NEISSERIA GONORRHOEAE BY AMP: POSITIVE
SPECIMEN SOURCE: ABNORMAL

## 2021-04-24 ENCOUNTER — TELEPHONE (OUTPATIENT)
Dept: PHARMACY | Age: 26
End: 2021-04-24

## 2021-04-24 NOTE — TELEPHONE ENCOUNTER
Pharmacy Note  ED Culture Follow-up    Elenora Harada is a 30 y.o. female. Allergies: Patient has no known allergies. Labs:  Lab Results   Component Value Date    BUN 15 04/20/2021    CREATININE 0.8 04/20/2021    WBC 4.4 (L) 04/20/2021     CrCl cannot be calculated (Unknown ideal weight.). Current antimicrobials:   · Received ceftriaxone 500mg IV x1 and azithromycin 500mg PO x1 in the ED  · Currently taking doxycycline 100mg BID x10 days    ASSESSMENT:  Micro results:   Genital culture: positive for gonorrhea     PLAN:  Need for intervention: Inform patient of positive result  Discussed with: Sheyla Valerio PA-C  Chosen treatment:    · Patient already received appropriate treatment  · Instruct patient to contact sexual partner(s) and inform them of result. Instruct to abstain from sexual activity x7 days and to follow-up with the [facility]. Patient response:   · Called and spoke with patient. Patient verbalized understanding of the above. Called/sent in prescription to: Not applicable    Please call with any questions.  Guy Enriquez, PharmD 6:40 PM 4/24/2021

## 2021-06-25 ENCOUNTER — HOSPITAL ENCOUNTER (EMERGENCY)
Age: 26
Discharge: HOME | End: 2021-06-25
Payer: MEDICAID

## 2021-06-25 VITALS
BODY MASS INDEX: 18.04 KG/M2 | DIASTOLIC BLOOD PRESSURE: 69 MMHG | SYSTOLIC BLOOD PRESSURE: 110 MMHG | HEIGHT: 70 IN | WEIGHT: 126 LBS | HEART RATE: 71 BPM | RESPIRATION RATE: 16 BRPM | OXYGEN SATURATION: 99 % | TEMPERATURE: 98.2 F

## 2021-06-25 DIAGNOSIS — Z32.01 POSITIVE PREGNANCY TEST: Primary | ICD-10-CM

## 2021-06-25 LAB
BILIRUBIN URINE: NEGATIVE
BLOOD, URINE: NEGATIVE
CHARACTER, URINE: CLEAR
COLOR: YELLOW
GLUCOSE URINE: NEGATIVE MG/DL
KETONES, URINE: NEGATIVE
LEUKOCYTE ESTERASE, URINE: NEGATIVE
NITRITE, URINE: NEGATIVE
PH, URINE: 8.5 (ref 5–9)
PREGNANCY, SERUM: POSITIVE
PROTEIN, URINE: NEGATIVE MG/DL
SPECIFIC GRAVITY, URINE: 1.02 (ref 1–1.03)
UROBILINOGEN, URINE: 0.2 EU/DL (ref 0.2–1)

## 2021-06-25 PROCEDURE — 84703 CHORIONIC GONADOTROPIN ASSAY: CPT

## 2021-06-25 PROCEDURE — 81003 URINALYSIS AUTO W/O SCOPE: CPT

## 2021-06-25 PROCEDURE — 36415 COLL VENOUS BLD VENIPUNCTURE: CPT

## 2021-06-25 PROCEDURE — 99202 OFFICE O/P NEW SF 15 MIN: CPT | Performed by: NURSE PRACTITIONER

## 2021-06-25 PROCEDURE — 99213 OFFICE O/P EST LOW 20 MIN: CPT

## 2021-06-25 RX ORDER — DIPHENHYDRAMINE HYDROCHLORIDE 25 MG/1
25 CAPSULE ORAL EVERY 6 HOURS PRN
Qty: 30 TABLET | Refills: 0 | Status: SHIPPED | OUTPATIENT
Start: 2021-06-25 | End: 2021-11-08

## 2021-06-25 ASSESSMENT — ENCOUNTER SYMPTOMS
VOMITING: 0
ABDOMINAL PAIN: 0
COUGH: 0
SHORTNESS OF BREATH: 0
NAUSEA: 0
DIARRHEA: 0

## 2021-06-25 NOTE — ED PROVIDER NOTES
[address] Yasmeen Keane       Chief Complaint   Patient presents with    Pregnancy Test    Emesis       Nurses Notes reviewed and I agree except as noted in the HPI. HISTORY OF PRESENT ILLNESS   Reanna Leung is a 30 y.o. female who presents with request for a pregnancy test. She states that her period should have started 15 days ago and she has taken 2 home pregnancy tests that were positive. She has had nausea and vomiting.  3 Para 2 A1. Patient has relocated to [place] from [place] and is in need of a new PCP and an OB/GYN. REVIEW OF SYSTEMS     Review of Systems   Constitutional: Negative for chills and fever. Respiratory: Negative for cough and shortness of breath. Cardiovascular: Negative for chest pain. Gastrointestinal: Negative for abdominal pain, diarrhea, nausea and vomiting. Genitourinary: Negative for dysuria, frequency, hematuria, urgency, vaginal bleeding and vaginal discharge. Skin: Negative for rash. Allergic/Immunologic: Negative for environmental allergies and food allergies. Neurological: Negative for dizziness and headaches. PAST MEDICAL HISTORY         Diagnosis Date    Anxiety     Asthma        SURGICAL HISTORY     Patient  has no past surgical history on file. CURRENT MEDICATIONS       Discharge Medication List as of 2021  5:40 PM      CONTINUE these medications which have NOT CHANGED    Details   albuterol (ACCUNEB) 0.63 MG/3ML nebulizer solution Take 1 ampule by nebulization every 6 hours as needed for WheezingHistorical Med      albuterol sulfate HFA (VENTOLIN HFA) 108 (90 Base) MCG/ACT inhaler Inhale 2 puffs into the lungs every 6 hours as needed for WheezingHistorical Med      diclofenac (VOLTAREN) 50 MG EC tablet Take 1 tablet by mouth 2 times daily as needed for Pain, Disp-20 tablet, R-0Normal             ALLERGIES     Patient is has No Known Allergies.     FAMILY HISTORY Patient'sfamily history is not on file. SOCIAL HISTORY     Patient  reports that she has quit smoking. She has never used smokeless tobacco. She reports previous drug use. Drug: Marijuana. She reports that she does not drink alcohol. PHYSICAL EXAM     ED TRIAGE VITALS  BP: 110/69, Temp: 98.2 °F (36.8 °C), Pulse: 71, Resp: 16, SpO2: 99 %  Physical Exam  Vitals and nursing note reviewed. Constitutional:       General: She is not in acute distress. Appearance: Normal appearance. She is well-developed and well-groomed. HENT:      Head: Normocephalic and atraumatic. Right Ear: External ear normal.      Left Ear: External ear normal.      Mouth/Throat:      Lips: Pink. Mouth: Mucous membranes are moist.   Eyes:      Conjunctiva/sclera: Conjunctivae normal.      Right eye: Right conjunctiva is not injected. Left eye: Left conjunctiva is not injected. Pupils: Pupils are equal.   Cardiovascular:      Rate and Rhythm: Normal rate. Heart sounds: Normal heart sounds. Pulmonary:      Effort: Pulmonary effort is normal. No respiratory distress. Breath sounds: Normal breath sounds. Abdominal:      General: Abdomen is flat. Bowel sounds are normal.      Palpations: Abdomen is soft. Musculoskeletal:      Cervical back: Normal range of motion. Skin:     General: Skin is warm and dry. Findings: No rash (on exposed surfaces). Neurological:      Mental Status: She is alert and oriented to person, place, and time. Psychiatric:         Mood and Affect: Mood normal. Affect is tearful (Patient states that she is unsure if she wants to keep the pregnancy. ).          Speech: Speech normal.         Behavior: Behavior normal.         DIAGNOSTIC RESULTS   Labs:  Abnormal Labs Reviewed - No abnormal labs to display     IMAGING:  No orders to display     URGENT CARE COURSE:     Vitals:    06/25/21 1652   BP: 110/69   Pulse: 71   Resp: 16   Temp: 98.2 °F (36.8 °C)   TempSrc: Temporal SpO2: 99%   Weight: 126 lb (57.2 kg)   Height: 5' 10\" (1.778 m)       Medications - No data to display  PROCEDURES:  FINALIMPRESSION      1. Positive pregnancy test        DISPOSITION/PLAN   DISPOSITION Decision To Discharge 06/25/2021 05:34:43 PM      ED Course as of Jun 25 1753 Fri Jun 25, 2021   1723 Urinalysis:    Glucose, UA Negative   Bilirubin, Urine Negative   Ketones, Urine Negative   Specific Gravity, Urine 1.020   Blood, Urine Negative   pH, Urine 8.50   Protein, Urine Negative   Urobilinogen, Urine 0.20   Nitrite, Urine Negative   Leukocyte Esterase, Urine Negative   Color, UA Yellow   Character, Urine Clear [HA]   1753 Preg, Serum: POSITIVE [HA]      ED Course User Index  [LUNDBERG] Pennye Blood, APRN - CNP     Physical assessment findings, diagnostic testing(s) if applicable, and vital signs reviewed with patient/patient representative. Questions answered. If applicable, patient/patient representative will be contacted upon receipt of final culture and sensitivity or other testing results when available. Any additions or changes to medications or changes the plan of care will be made at that time. Medications as directed, including OTC medications for supportive care. Education provided on medications. Differential diagnosis(s) discussed with patient/patient representative. Home care/self care instructions reviewed with patient/patient representative. Patient is to follow-up with family care provider in 2-3 days if no improvement. Patient is to go to the emergency department if symptoms worsen. Patient/patient representative is aware of care plan, questions answered, verbalizes understanding and is in agreement. Teach back method used for patient/patient representative teaching(s) and printed instructions attached to after visit summary.       Problem List Items Addressed This Visit     None      Visit Diagnoses     Positive pregnancy test    -  Primary    Relevant Medications    vitamin B-6 (PYRIDOXINE) 25 MG tablet    Other Relevant Orders    Claudia Meade MD, Obstetrics & Gynecology, [facility], Niraj, Psychology, [facility] LO CHA II.VIERTEL          PATIENT REFERRED TO:  [address] [facility] [address]  [address] [address] [facility] [address] [facility]  Schedule an appointment as soon as possible for a visit in 3 days  if you do not have a family provider, If symptoms change/worsen, go to the [address] [facility]  [facility] [address] [facility]  [phone]    as needed, If symptoms change/worsen, go to the [id number] [facility], [address] Rehana Whitten, APRN - CNP  06/25/21 [facility] Erin Oquendo, APRN - CNP  06/25/21 1218

## 2021-11-08 ENCOUNTER — HOSPITAL ENCOUNTER (EMERGENCY)
Age: 26
Discharge: HOME | End: 2021-11-08
Payer: MEDICAID

## 2021-11-08 VITALS
RESPIRATION RATE: 18 BRPM | TEMPERATURE: 98.6 F | HEART RATE: 71 BPM | DIASTOLIC BLOOD PRESSURE: 78 MMHG | SYSTOLIC BLOOD PRESSURE: 115 MMHG | OXYGEN SATURATION: 99 %

## 2021-11-08 DIAGNOSIS — J45.20 MILD INTERMITTENT ASTHMA, UNSPECIFIED WHETHER COMPLICATED: Primary | ICD-10-CM

## 2021-11-08 PROCEDURE — 99213 OFFICE O/P EST LOW 20 MIN: CPT | Performed by: NURSE PRACTITIONER

## 2021-11-08 PROCEDURE — 99213 OFFICE O/P EST LOW 20 MIN: CPT

## 2021-11-08 RX ORDER — BROMPHENIRAMINE MALEATE, PSEUDOEPHEDRINE HYDROCHLORIDE, AND DEXTROMETHORPHAN HYDROBROMIDE 2; 30; 10 MG/5ML; MG/5ML; MG/5ML
2.5 SYRUP ORAL 4 TIMES DAILY PRN
Qty: 118 ML | Refills: 0 | Status: SHIPPED | OUTPATIENT
Start: 2021-11-08 | End: 2022-10-28

## 2021-11-08 RX ORDER — ALBUTEROL SULFATE 0.63 MG/3ML
1 SOLUTION RESPIRATORY (INHALATION) EVERY 6 HOURS PRN
Qty: 270 ML | Refills: 0 | Status: SHIPPED | OUTPATIENT
Start: 2021-11-08

## 2021-11-08 RX ORDER — PREDNISONE 20 MG/1
20 TABLET ORAL 2 TIMES DAILY
Qty: 10 TABLET | Refills: 0 | Status: SHIPPED | OUTPATIENT
Start: 2021-11-08 | End: 2021-11-13

## 2021-11-08 RX ORDER — ALBUTEROL SULFATE 90 UG/1
2 AEROSOL, METERED RESPIRATORY (INHALATION) EVERY 6 HOURS PRN
Qty: 18 G | Refills: 0 | Status: SHIPPED | OUTPATIENT
Start: 2021-11-08

## 2021-11-08 ASSESSMENT — ENCOUNTER SYMPTOMS
DIARRHEA: 0
RHINORRHEA: 0
SORE THROAT: 0
NAUSEA: 0
CHEST TIGHTNESS: 1
SHORTNESS OF BREATH: 1
VOMITING: 0
COUGH: 0

## 2021-11-08 NOTE — ED PROVIDER NOTES
[facility]  Urgent Care Encounter       CHIEF COMPLAINT       Chief Complaint   Patient presents with    Asthma       Nurses Notes reviewed and I agree except as noted in the HPI. HISTORY OF PRESENT ILLNESS   Morena Varela is a 30 y.o. female who presents to the [facility] urgent care for evaluation of dyspnea. She reports having a history of asthma. She reports that this feels similar to an asthma attack, but she does have chest tightness. She reports it started 2 days ago, but worsened last night. She denies nasal congestion, rhinorrhea or postnasal drainage. She denies cough or sore throat. She denies headache, dizziness, and blurred vision. She does report mild improvement with albuterol inhaler and nebulizer. Lung sounds were clear throughout. The history is provided by the patient. No  was used. REVIEW OF SYSTEMS     Review of Systems   Constitutional: Negative for activity change, appetite change, chills, fatigue and fever. HENT: Negative for ear discharge, ear pain, rhinorrhea and sore throat. Respiratory: Positive for chest tightness and shortness of breath. Negative for cough. Cardiovascular: Negative for chest pain. Gastrointestinal: Negative for diarrhea, nausea and vomiting. Genitourinary: Negative for dysuria. Skin: Negative for rash. Allergic/Immunologic: Negative for environmental allergies and food allergies. Neurological: Negative for dizziness and headaches. PAST MEDICAL HISTORY         Diagnosis Date    Anxiety     Asthma        SURGICALHISTORY     Patient  has no past surgical history on file. CURRENT MEDICATIONS       Discharge Medication List as of 11/8/2021  1:49 PM          ALLERGIES     Patient is has No Known Allergies. Patients   There is no immunization history on file for this patient. FAMILY HISTORY     Patient's family history is not on file.     SOCIAL HISTORY     Patient  reports that she has quit smoking. She has never used smokeless tobacco. She reports previous drug use. Drug: Marijuana Devan Mustard). She reports that she does not drink alcohol. PHYSICAL EXAM     ED TRIAGE VITALS  BP: 115/78, Temp: 98.6 °F (37 °C), Pulse: 71, Resp: 18, SpO2: 99 %,Estimated body mass index is 18.08 kg/m² as calculated from the following:    Height as of 6/25/21: 5' 10\" (1.778 m). Weight as of 6/25/21: 126 lb (57.2 kg). ,Patient's last menstrual period was 05/10/2021. Physical Exam  Vitals and nursing note reviewed. Constitutional:       General: She is not in acute distress. Appearance: Normal appearance. She is not ill-appearing, toxic-appearing or diaphoretic. HENT:      Head: Normocephalic. Right Ear: Ear canal and external ear normal.      Left Ear: Ear canal and external ear normal.      Nose: Nose normal. No congestion or rhinorrhea. Mouth/Throat:      Mouth: Mucous membranes are moist.      Pharynx: Oropharynx is clear. No oropharyngeal exudate or posterior oropharyngeal erythema. Cardiovascular:      Rate and Rhythm: Normal rate. Pulses: Normal pulses. Pulmonary:      Effort: Pulmonary effort is normal. No respiratory distress. Breath sounds: No stridor. No wheezing or rhonchi. Abdominal:      General: Abdomen is flat. Bowel sounds are normal.      Palpations: Abdomen is soft. Musculoskeletal:         General: No swelling or tenderness. Normal range of motion. Cervical back: Normal range of motion. Neurological:      General: No focal deficit present. Mental Status: She is alert and oriented to person, place, and time. Psychiatric:         Mood and Affect: Mood normal.         Behavior: Behavior normal.         DIAGNOSTIC RESULTS     Labs:No results found for this visit on 11/08/21.     IMAGING:    No orders to display         EKG: None      URGENT CARE COURSE:     Vitals:    11/08/21 1311   BP: 115/78   Pulse: 71   Resp: 18   Temp: 98.6 °F (37 °C) TempSrc: Temporal   SpO2: 99%       Medications - No data to display         PROCEDURES:  None    FINAL IMPRESSION      1. Mild intermittent asthma, unspecified whether complicated          DISPOSITION/ PLAN     Patient seen and evaluated for dyspnea. Assessment consistent with mild intermittent asthma. She is provided a refill of her albuterol inhaler and nebulizer. She is also provided prescription for prednisone and Bromfed-DM. She is instructed use over-the-counter Tylenol and Motrin for pain or fever. She declines a Covid test at this time. She is instructed to follow-up with her PCP in 3 to 5 days new worsening symptoms. She is agreeable with above plan and denies questions or concerns at this time. PATIENT REFERRED TO:  No primary care provider on file. No primary physician on file.       DISCHARGE MEDICATIONS:  Discharge Medication List as of 11/8/2021  1:49 PM      START taking these medications    Details   predniSONE (DELTASONE) 20 MG tablet Take 1 tablet by mouth 2 times daily for 5 days, Disp-10 tablet, R-0Normal      brompheniramine-pseudoephedrine-DM 2-30-10 MG/5ML syrup Take 2.5 mLs by mouth 4 times daily as needed for Congestion or Cough, Disp-118 mL, R-0Normal             Discharge Medication List as of 11/8/2021  1:49 PM      STOP taking these medications       vitamin B-6 (PYRIDOXINE) 25 MG tablet Comments:   Reason for Stopping:         diclofenac (VOLTAREN) 50 MG EC tablet Comments:   Reason for Stopping:               Discharge Medication List as of 11/8/2021  1:49 PM      CONTINUE these medications which have CHANGED    Details   albuterol (ACCUNEB) 0.63 MG/3ML nebulizer solution Take 3 mLs by nebulization every 6 hours as needed for Wheezing, Disp-270 mL, R-0Normal      albuterol sulfate HFA (VENTOLIN HFA) 108 (90 Base) MCG/ACT inhaler Inhale 2 puffs into the lungs every 6 hours as needed for Wheezing, Disp-18 g, R-0Normal             CONOR Horta - MICHAEL    (Please

## 2021-11-08 NOTE — ED TRIAGE NOTES
Rosalia Barnes arrives to room with complaint of shortness of breath asthma flare symptoms started 2 days ago.

## 2022-08-02 ENCOUNTER — APPOINTMENT (OUTPATIENT)
Dept: ULTRASOUND IMAGING | Age: 27
End: 2022-08-02
Payer: MEDICAID

## 2022-08-02 ENCOUNTER — HOSPITAL ENCOUNTER (EMERGENCY)
Age: 27
Discharge: HOME | End: 2022-08-02
Payer: MEDICAID

## 2022-08-02 VITALS
HEIGHT: 70 IN | OXYGEN SATURATION: 100 % | BODY MASS INDEX: 17.61 KG/M2 | WEIGHT: 123 LBS | RESPIRATION RATE: 16 BRPM | TEMPERATURE: 97 F | HEART RATE: 68 BPM | DIASTOLIC BLOOD PRESSURE: 81 MMHG | SYSTOLIC BLOOD PRESSURE: 108 MMHG

## 2022-08-02 DIAGNOSIS — R10.30 LOWER ABDOMINAL PAIN: Primary | ICD-10-CM

## 2022-08-02 LAB
ALBUMIN SERPL-MCNC: 4.5 G/DL (ref 3.5–5.1)
ALP BLD-CCNC: 54 U/L (ref 38–126)
ALT SERPL-CCNC: 13 U/L (ref 11–66)
ANION GAP SERPL CALCULATED.3IONS-SCNC: 9 MEQ/L (ref 8–16)
AST SERPL-CCNC: 17 U/L (ref 5–40)
BACTERIA: ABNORMAL /HPF
BASOPHILS # BLD: 0.3 %
BASOPHILS ABSOLUTE: 0 THOU/MM3 (ref 0–0.1)
BILIRUB SERPL-MCNC: 0.4 MG/DL (ref 0.3–1.2)
BILIRUBIN URINE: NEGATIVE
BLOOD, URINE: NEGATIVE
BUN BLDV-MCNC: 15 MG/DL (ref 7–22)
C-REACTIVE PROTEIN: < 0.3 MG/DL (ref 0–1)
CALCIUM SERPL-MCNC: 9.8 MG/DL (ref 8.5–10.5)
CASTS 2: ABNORMAL /LPF
CASTS UA: ABNORMAL /LPF
CHARACTER, URINE: CLEAR
CHLAMYDIA TRACHOMATIS BY RT-PCR: NOT DETECTED
CHLORIDE BLD-SCNC: 104 MEQ/L (ref 98–111)
CO2: 26 MEQ/L (ref 23–33)
COLOR: YELLOW
CREAT SERPL-MCNC: 0.8 MG/DL (ref 0.4–1.2)
CRYSTALS, UA: ABNORMAL
CT/NG SOURCE: NORMAL
EOSINOPHIL # BLD: 1.2 %
EOSINOPHILS ABSOLUTE: 0.1 THOU/MM3 (ref 0–0.4)
EPITHELIAL CELLS, UA: ABNORMAL /HPF
ERYTHROCYTE [DISTWIDTH] IN BLOOD BY AUTOMATED COUNT: 13.9 % (ref 11.5–14.5)
ERYTHROCYTE [DISTWIDTH] IN BLOOD BY AUTOMATED COUNT: 44.1 FL (ref 35–45)
GFR SERPL CREATININE-BSD FRML MDRD: > 90 ML/MIN/1.73M2
GLUCOSE BLD-MCNC: 89 MG/DL (ref 70–108)
GLUCOSE URINE: NEGATIVE MG/DL
HCT VFR BLD CALC: 43 % (ref 37–47)
HEMOGLOBIN: 14.3 GM/DL (ref 12–16)
IMMATURE GRANS (ABS): 0.02 THOU/MM3 (ref 0–0.07)
IMMATURE GRANULOCYTES: 0.3 %
KETONES, URINE: NEGATIVE
KOH PREP: NORMAL
LEUKOCYTE ESTERASE, URINE: ABNORMAL
LYMPHOCYTES # BLD: 20.7 %
LYMPHOCYTES ABSOLUTE: 1.3 THOU/MM3 (ref 1–4.8)
MCH RBC QN AUTO: 28.9 PG (ref 26–33)
MCHC RBC AUTO-ENTMCNC: 33.3 GM/DL (ref 32.2–35.5)
MCV RBC AUTO: 87 FL (ref 81–99)
MISCELLANEOUS 2: ABNORMAL
MONOCYTES # BLD: 5.1 %
MONOCYTES ABSOLUTE: 0.3 THOU/MM3 (ref 0.4–1.3)
NEISSERIA GONORRHOEAE BY RT-PCR: NOT DETECTED
NITRITE, URINE: NEGATIVE
NUCLEATED RED BLOOD CELLS: 0 /100 WBC
OSMOLALITY CALCULATION: 277.8 MOSMOL/KG (ref 275–300)
PH UA: 7 (ref 5–9)
PLATELET # BLD: 260 THOU/MM3 (ref 130–400)
PMV BLD AUTO: 10.2 FL (ref 9.4–12.4)
POTASSIUM REFLEX MAGNESIUM: 4.4 MEQ/L (ref 3.5–5.2)
PREGNANCY, SERUM: NEGATIVE
PREGNANCY, URINE: NEGATIVE
PROTEIN UA: NEGATIVE
RBC # BLD: 4.94 MILL/MM3 (ref 4.2–5.4)
RBC URINE: ABNORMAL /HPF
RENAL EPITHELIAL, UA: ABNORMAL
SEG NEUTROPHILS: 72.4 %
SEGMENTED NEUTROPHILS ABSOLUTE COUNT: 4.7 THOU/MM3 (ref 1.8–7.7)
SODIUM BLD-SCNC: 139 MEQ/L (ref 135–145)
SPECIFIC GRAVITY, URINE: 1.01 (ref 1–1.03)
TOTAL PROTEIN: 7.2 G/DL (ref 6.1–8)
TRICHOMONAS PREP: NORMAL
UROBILINOGEN, URINE: 0.2 EU/DL (ref 0–1)
WBC # BLD: 6.5 THOU/MM3 (ref 4.8–10.8)
WBC UA: ABNORMAL /HPF
YEAST: ABNORMAL

## 2022-08-02 PROCEDURE — 87220 TISSUE EXAM FOR FUNGI: CPT

## 2022-08-02 PROCEDURE — 87077 CULTURE AEROBIC IDENTIFY: CPT

## 2022-08-02 PROCEDURE — 87210 SMEAR WET MOUNT SALINE/INK: CPT

## 2022-08-02 PROCEDURE — 6370000000 HC RX 637 (ALT 250 FOR IP): Performed by: PHYSICIAN ASSISTANT

## 2022-08-02 PROCEDURE — 80053 COMPREHEN METABOLIC PANEL: CPT

## 2022-08-02 PROCEDURE — 76830 TRANSVAGINAL US NON-OB: CPT

## 2022-08-02 PROCEDURE — 87205 SMEAR GRAM STAIN: CPT

## 2022-08-02 PROCEDURE — 87186 SC STD MICRODIL/AGAR DIL: CPT

## 2022-08-02 PROCEDURE — 2500000003 HC RX 250 WO HCPCS: Performed by: PHYSICIAN ASSISTANT

## 2022-08-02 PROCEDURE — 86140 C-REACTIVE PROTEIN: CPT

## 2022-08-02 PROCEDURE — 93975 VASCULAR STUDY: CPT

## 2022-08-02 PROCEDURE — 87070 CULTURE OTHR SPECIMN AEROBIC: CPT

## 2022-08-02 PROCEDURE — 81001 URINALYSIS AUTO W/SCOPE: CPT

## 2022-08-02 PROCEDURE — 87086 URINE CULTURE/COLONY COUNT: CPT

## 2022-08-02 PROCEDURE — 96372 THER/PROPH/DIAG INJ SC/IM: CPT

## 2022-08-02 PROCEDURE — 87491 CHLMYD TRACH DNA AMP PROBE: CPT

## 2022-08-02 PROCEDURE — 36415 COLL VENOUS BLD VENIPUNCTURE: CPT

## 2022-08-02 PROCEDURE — 99284 EMERGENCY DEPT VISIT MOD MDM: CPT

## 2022-08-02 PROCEDURE — 6360000002 HC RX W HCPCS: Performed by: PHYSICIAN ASSISTANT

## 2022-08-02 PROCEDURE — 81025 URINE PREGNANCY TEST: CPT

## 2022-08-02 PROCEDURE — 85025 COMPLETE CBC W/AUTO DIFF WBC: CPT

## 2022-08-02 PROCEDURE — 87591 N.GONORRHOEAE DNA AMP PROB: CPT

## 2022-08-02 PROCEDURE — 84703 CHORIONIC GONADOTROPIN ASSAY: CPT

## 2022-08-02 RX ORDER — NAPROXEN 500 MG/1
500 TABLET ORAL 2 TIMES DAILY PRN
Qty: 20 TABLET | Refills: 0 | Status: SHIPPED | OUTPATIENT
Start: 2022-08-02 | End: 2022-08-12

## 2022-08-02 RX ORDER — AZITHROMYCIN 250 MG/1
1000 TABLET, FILM COATED ORAL ONCE
Status: COMPLETED | OUTPATIENT
Start: 2022-08-02 | End: 2022-08-02

## 2022-08-02 RX ORDER — NAPROXEN 250 MG/1
500 TABLET ORAL ONCE
Status: COMPLETED | OUTPATIENT
Start: 2022-08-02 | End: 2022-08-02

## 2022-08-02 RX ADMIN — LIDOCAINE HYDROCHLORIDE 500 MG: 10 INJECTION, SOLUTION EPIDURAL; INFILTRATION; INTRACAUDAL; PERINEURAL at 14:18

## 2022-08-02 RX ADMIN — NAPROXEN 500 MG: 250 TABLET ORAL at 14:17

## 2022-08-02 RX ADMIN — AZITHROMYCIN MONOHYDRATE 1000 MG: 250 TABLET ORAL at 14:18

## 2022-08-02 ASSESSMENT — ENCOUNTER SYMPTOMS
COUGH: 0
DIARRHEA: 0
BLOOD IN STOOL: 0
SORE THROAT: 0
CHEST TIGHTNESS: 0
STRIDOR: 0
CONSTIPATION: 0
RHINORRHEA: 0
TROUBLE SWALLOWING: 0
NAUSEA: 1
EYE DISCHARGE: 0
ANAL BLEEDING: 0
ABDOMINAL DISTENTION: 0
EYE PAIN: 0
SHORTNESS OF BREATH: 0
ABDOMINAL PAIN: 1
VOMITING: 1
WHEEZING: 0

## 2022-08-02 ASSESSMENT — PAIN - FUNCTIONAL ASSESSMENT
PAIN_FUNCTIONAL_ASSESSMENT: 0-10

## 2022-08-02 ASSESSMENT — PAIN SCALES - GENERAL
PAINLEVEL_OUTOF10: 6
PAINLEVEL_OUTOF10: 4
PAINLEVEL_OUTOF10: 6
PAINLEVEL_OUTOF10: 2
PAINLEVEL_OUTOF10: 6

## 2022-08-02 ASSESSMENT — PAIN DESCRIPTION - LOCATION
LOCATION: BACK
LOCATION: ABDOMEN

## 2022-08-02 NOTE — DISCHARGE INSTRUCTIONS
Recommend ice/heat for pain, NSAIDS/tylenol for pain as needed, rest. Avoid sexual activity until further improvement. Follow up with OBGYN in the next 2-3 days for re-evaluation. If worsening symptoms of abdominal pain, fevers, vomiting may follow up to ED for re-evaluation.

## 2022-08-02 NOTE — ED NOTES
PT changing into gown at this time for pelvic exam, family at bedside, patient denies any needs .      Jules Duvall RN  08/02/22 7749

## 2022-08-02 NOTE — ED PROVIDER NOTES
[address] [facility] [address] [address] EMERGENCY DEPT    EMERGENCY MEDICINE     Pt Name: Sebastian Horne  MRN: [id number]  [place] 1995  Date of evaluation: 8/2/2022  Provider: Reji Stewart PA-C    CHIEF COMPLAINT       Chief Complaint   Patient presents with    Abdominal Pain    Vaginal Bleeding    Emesis       HISTORY OF PRESENT ILLNESS    Sebastian Horne is a pleasant 27 y.o. [id number] female who presents to the emergency department for vaginal bleeding, abdominal pain, and emesis. She reports that 4 weeks ago, one day after finishing her last menstrual period, her male partner's condom broke during intercourse. She took a Plan B pill at the time and one week later started experiencing heavy vaginal bleeding that last for 2 weeks. 1 week ago this bleeding turned to darker brown spotting, and she started experiencing constant 5/10 suprapubic pain and aching lower back pain. 3 days ago she also started having nausea and vomiting once per day, and has been eating less. A home pregnancy test was negative 3 days ago. She also notes that yesterday she experienced severe pain on insertion while attempting intercourse with her partner. Additionally, she reports less frequent, stronger-smelling urine. No issues bowel movements. She denies any hematemesis, SOB, weakness, fever, chills, dysuria, hematuria, hematochezia, vaginal pain, itching, rashes, or discharge, and URI symptoms. Her hx is pertinent for previous infections with chlamydia and gonorrhea, and a family hx of ovarian cancer. Triage notes and Nursing notes were reviewed by myself. Any discrepancies are addressed above. PAST MEDICAL HISTORY     Past Medical History:   Diagnosis Date    Anxiety     Asthma        SURGICAL HISTORY     No past surgical history on file.     CURRENT MEDICATIONS       Discharge Medication List as of 8/2/2022  4:21 PM        CONTINUE these medications which have NOT CHANGED    Details   albuterol (ACCUNEB) 0.63 MG/3ML nebulizer solution Take 3 mLs by nebulization every 6 hours as needed for Wheezing, Disp-270 mL, R-0Normal      albuterol sulfate HFA (VENTOLIN HFA) 108 (90 Base) MCG/ACT inhaler Inhale 2 puffs into the lungs every 6 hours as needed for Wheezing, Disp-18 g, R-0Normal      brompheniramine-pseudoephedrine-DM 2-30-10 MG/5ML syrup Take 2.5 mLs by mouth 4 times daily as needed for Congestion or Cough, Disp-118 mL, R-0Normal             ALLERGIES     Patient has no known allergies. FAMILY HISTORY     No family history on file. SOCIAL HISTORY       Social History     Socioeconomic History    Marital status: Single   Tobacco Use    Smoking status: Former    Smokeless tobacco: Never   Vaping Use    Vaping Use: Some days    Substances: Nicotine   Substance and Sexual Activity    Alcohol use: Never    Drug use: Not Currently     Types: Marijuana Kavon Semen)    Sexual activity: Yes     Partners: Male, Female       REVIEW OF SYSTEMS     Review of Systems   Constitutional:  Negative for appetite change (but eating less dt vomiting), chills, diaphoresis, fatigue and fever. HENT:  Negative for congestion, mouth sores, rhinorrhea, sneezing, sore throat, tinnitus and trouble swallowing. Eyes:  Negative for pain and discharge. Respiratory:  Negative for cough, chest tightness, shortness of breath, wheezing and stridor. Cardiovascular:  Negative for chest pain and palpitations. Gastrointestinal:  Positive for abdominal pain, nausea and vomiting. Negative for abdominal distention, anal bleeding, blood in stool, constipation and diarrhea. Endocrine: Negative for cold intolerance and polyuria. Genitourinary:  Positive for decreased urine volume, dyspareunia, menstrual problem and pelvic pain. Negative for difficulty urinating, dysuria, flank pain, frequency, genital sores, hematuria, urgency, vaginal bleeding, vaginal discharge and vaginal pain. Musculoskeletal:  Negative for myalgias and neck pain. Skin:  Negative for rash. Neurological:  Positive for headaches (her usual HAs). Negative for dizziness, syncope, weakness, light-headedness and numbness. Hematological:  Does not bruise/bleed easily. Psychiatric/Behavioral:  Negative for confusion, self-injury and suicidal ideas. All other systems reviewed and are negative. Except as noted above the remainder of the review of systems was reviewed and is. SCREENINGS        Blake Coma Scale  Eye Opening: Spontaneous  Best Verbal Response: Oriented  Best Motor Response: Obeys commands  Blake Coma Scale Score: 15                 PHYSICAL EXAM     INITIAL VITALS:  height is 5' 10\" (1.778 m) and weight is 123 lb (55.8 kg). Her oral temperature is 97 °F (36.1 °C). Her blood pressure is 108/81 and her pulse is 68. Her respiration is 16 and oxygen saturation is 100%. Physical Exam  Vitals and nursing note reviewed. Constitutional:       General: She is not in acute distress. Appearance: Normal appearance. She is well-developed and normal weight. She is not ill-appearing, toxic-appearing or diaphoretic. HENT:      Head: Normocephalic and atraumatic. Right Ear: External ear normal.      Left Ear: External ear normal.      Nose: Nose normal.      Mouth/Throat:      Mouth: Mucous membranes are moist.      Pharynx: Oropharynx is clear. No oropharyngeal exudate. Eyes:      General: No scleral icterus. Extraocular Movements: Extraocular movements intact. Pupils: Pupils are equal, round, and reactive to light. Cardiovascular:      Rate and Rhythm: Normal rate and regular rhythm. Pulses: Normal pulses. Heart sounds: Normal heart sounds. No murmur heard. Pulmonary:      Effort: Pulmonary effort is normal. No respiratory distress. Breath sounds: Normal breath sounds. No wheezing or rales. Abdominal:      General: Abdomen is flat. Bowel sounds are normal. There is no distension. There are no signs of injury. Palpations: Abdomen is soft. There is no hepatomegaly or splenomegaly. Tenderness: There is abdominal tenderness in the right lower quadrant, suprapubic area and left lower quadrant. There is no rebound. Negative signs include McBurney's sign. Hernia: No hernia is present. Genitourinary:     Vagina: No signs of injury. No vaginal discharge, erythema, tenderness or bleeding. Cervix: Discharge (creamy, thin, non-frothy) present. No cervical motion tenderness or friability. Musculoskeletal:         General: Normal range of motion. Cervical back: Normal range of motion and neck supple. Skin:     General: Skin is warm and dry. Capillary Refill: Capillary refill takes less than 2 seconds. Coloration: Skin is not jaundiced or pale. Findings: No erythema. Neurological:      General: No focal deficit present. Mental Status: She is alert and oriented to person, place, and time. GCS: GCS eye subscore is 4. GCS verbal subscore is 5. GCS motor subscore is 6. Motor: No weakness. Psychiatric:         Mood and Affect: Mood normal.         Behavior: Behavior normal.         Thought Content: Thought content normal.       DIFFERENTIAL DIAGNOSIS:   Differential diagnoses are discussed    DIAGNOSTIC RESULTS       RADIOLOGY: (none if blank)   I directly visualized the following images and reviewed the radiologist interpretations. Interpretation per the Radiologist below, if available at the time of this note:  US NON OB TRANSVAGINAL   Final Result   Small to moderate amount of free fluid in the cul-de-sac. No torsion. **This report has been created using voice recognition software. It may contain minor errors which are inherent in voice recognition technology. **      Final report electronically signed by Dr. Kacie Larios on 8/2/2022 3:53 PM      US DUP ABD PEL RETRO SCROT COMPLETE   Final Result   Small to moderate amount of free fluid in the cul-de-sac. No torsion.             **This report has been created using voice recognition software. It may contain minor errors which are inherent in voice recognition technology. **      Final report electronically signed by Dr. Romina Sanchez on 8/2/2022 3:53 PM          LABS:   Labs Reviewed   CULTURE, REFLEXED, URINE - Abnormal; Notable for the following components:       Result Value    Organism Escherichia coli (*)     All other components within normal limits    Narrative:     Source: urine, clean catch       Site: clean void          Current Antibiotics: not stated   URINE WITH REFLEXED MICRO - Abnormal; Notable for the following components:    Leukocyte Esterase, Urine SMALL (*)     All other components within normal limits   CBC WITH AUTO DIFFERENTIAL - Abnormal; Notable for the following components:    Monocytes Absolute 0.3 (*)     All other components within normal limits   CULTURE, GENITAL    Narrative:     Source: vaginal non-OB patient       Site:           Current Antibiotics: not stated   SOFI Wilian Esters)    Narrative:     Source: vaginal non-OB patient       Site:           Current Antibiotics: not stated   WET PREP, GENITAL    Narrative:     Source: vaginal non-OB patient       Site:           Current Antibiotics: not stated   C. TRACHOMATIS / N. GONORRHOEAE, DNA   PREGNANCY, URINE   COMPREHENSIVE METABOLIC PANEL W/ REFLEX TO MG FOR LOW K   HCG, SERUM, QUALITATIVE   C-REACTIVE PROTEIN   ANION GAP   GLOMERULAR FILTRATION RATE, ESTIMATED   OSMOLALITY       All other labs were within normal range or not returned as of this dictation. Please note, any cultures that may have been sent were not resulted at the time of this patient visit. EMERGENCY DEPARTMENT COURSE:   Vitals:    Vitals:    08/02/22 1256 08/02/22 1335 08/02/22 1451 08/02/22 1553   BP: 115/86 110/77  108/81   Pulse: 55 60  68   Resp: 18  18 16   Temp:       TempSrc:       SpO2: 100%   100%   Weight:       Height:         1:07 PM EDT: The patient was seen and evaluated.     PROCEDURES: (None if blank)  Procedures         ED Medications administered this visit:    Medications   naproxen (NAPROSYN) tablet 500 mg (500 mg Oral Given 8/2/22 1417)   cefTRIAXone (ROCEPHIN) 500 mg in lidocaine 1 % 1.4286 mL IM Injection (500 mg IntraMUSCular Given 8/2/22 1418)   azithromycin (ZITHROMAX) tablet 1,000 mg (1,000 mg Oral Given 8/2/22 1418)       MDM:  Patient is a 27y/o female who came to the ED to be evaluated for abdominal pain. Appropriate testing/imaging of the abdomen/ pelvis was done based on the patient's initial complaints, history, and physical exam.   Pertinent results were negative UPT, CBC-D, negative STI panels and cultures, small-moderate amount of free fluid in the cul-de-sac on transvaginal US, and small leukocytes on UA. With patient having history of gonorrhea/chlamydia along with pelvic discomfort would like to treat for possible STD and patient was given azithromycin along with Rocephin. Patient was given medication for pain as well. STD panel pending. Patient had noted improvement with the pain medicine and with no acute findings on ultrasound he be discharged home from the ED. Recommend follow-up with OB/GYN in the next week for reevaluation. If any pending results come back positive we will treat accordingly. Vital signs are stable and afebrile. Pain is controlled currently. Patient was seen independently by myself. The patient's final impression and disposition and plan was determined by myself. Strict return precautions and follow up instructions were discussed with the patient prior to discharge, with which the patient agrees. Physical assessment findings, diagnostic testing(s) if applicable, and vital signs reviewed with patient/patient representative. Questions answered. Medications as directed, including OTC medications for supportive care. Education provided on medications. Differential diagnosis(s) discussed with patient/patient representative.   Home care/self care instructions reviewed with patient/patient representative. Patient is to follow-up with family care provider in 2 days if no improvement. Patient is to go to the emergency department if symptoms worsen. Patient/patient representative is aware of care plan, questions answered, verbalizes understanding and is in agreement. CRITICAL CARE:   None    CONSULTS:  None    PROCEDURES:  None    FINAL IMPRESSION      1. Lower abdominal pain          DISPOSITION/PLAN   Discharge home    PATIENT REFERRED TO:  [facility]  [address] [place] [address]  In 2 days  For re-evaluation    [facility] EMERGENCY DEPT  [address]  [address] [place] [address]  [phone]  In 2 days  If symptoms worsen    DISCHARGEMEDICATIONS:  Discharge Medication List as of 8/2/2022  4:21 PM        START taking these medications    Details   naproxen (NAPROSYN) 500 MG tablet Take 1 tablet by mouth 2 times daily as needed for Pain, Disp-20 tablet, R-0Normal                  (Please note that portions of this note were completed with a voice recognition program.  Efforts were made to edit the dictations but occasionallywords are mis-transcribed.)      Yeni Johnson PA-C(electronically signed)  Physician Associate, Emergency Department        Yeni Johnson PA-C  08/08/22 5631

## 2022-08-02 NOTE — ED NOTES
Pelvic exam completed with PA student. Swabs sent to lab,. PT requesting pain medications.       Darryl Oliva RN  08/02/22 9405

## 2022-08-02 NOTE — ED TRIAGE NOTES
PT presents to the ed with c/o abd pain, vaginal bleeding and emesis that started that several days ago. PT reports that she took a plan b pill 3 weeks ago and that the vaginal bleeding started after that. PT has not followed up with OB at this time. Pt reports that her last menstrual cycle was June 30th. Urine sample sent to lab.

## 2022-08-03 LAB
ORGANISM: ABNORMAL
URINE CULTURE REFLEX: ABNORMAL

## 2022-08-04 ENCOUNTER — TELEPHONE (OUTPATIENT)
Dept: PHARMACY | Age: 27
End: 2022-08-04

## 2022-08-04 NOTE — TELEPHONE ENCOUNTER
Pharmacy Note  ED Culture Follow-up    Good Howell is a 27 y.o. female. Allergies: Patient has no known allergies. Labs:  Lab Results   Component Value Date    BUN 15 08/02/2022    CREATININE 0.8 08/02/2022    WBC 6.5 08/02/2022     Estimated Creatinine Clearance: 92 mL/min (based on SCr of 0.8 mg/dL). Current antimicrobials:   None    ASSESSMENT:  Micro results:   Urine culture: positive for E. coli     PLAN:  Need for intervention: Yes  Discussed with: Virginie Beverly PA-C  Chosen treatment:    Call patient to see if she is still having urinary tract symptoms. If patient is still having symptoms prescribe nitrofurantoin 100 mg BID x 5 days    Patient response:   Call attempt #1, did not reach patient    Called/sent in prescription to: Not applicable    Please call with any questions.  Anastasiia Villatoro [facility], PharmD 5:17 PM 8/4/2022

## 2022-08-05 LAB
GENITAL CULTURE, ROUTINE: NORMAL
GRAM STAIN RESULT: NORMAL

## 2022-08-08 RX ORDER — NITROFURANTOIN 25; 75 MG/1; MG/1
100 CAPSULE ORAL 2 TIMES DAILY
Qty: 20 CAPSULE | Refills: 0 | Status: SHIPPED | OUTPATIENT
Start: 2022-08-08 | End: 2022-08-18

## 2022-08-08 NOTE — TELEPHONE ENCOUNTER
Patient returned phone call and stated that her urine was dark and she it also had a foul odor. Informed patient of her positive urine culture results. Informed patient that I could call in an antibiotic to treat infection. Patient voiced understanding. Prescription called into [facility].

## 2022-08-08 NOTE — TELEPHONE ENCOUNTER
Call attempt #2, did not reach patient. Left message for patient to call back at [phone].     [facility] [place], PharmD 5:38 PM 8/8/2022

## 2022-10-28 ENCOUNTER — HOSPITAL ENCOUNTER (EMERGENCY)
Age: 27
Discharge: HOME | End: 2022-10-28
Payer: MEDICAID

## 2022-10-28 VITALS
HEART RATE: 82 BPM | OXYGEN SATURATION: 100 % | RESPIRATION RATE: 20 BRPM | TEMPERATURE: 97 F | DIASTOLIC BLOOD PRESSURE: 69 MMHG | SYSTOLIC BLOOD PRESSURE: 109 MMHG

## 2022-10-28 DIAGNOSIS — N89.8 VAGINAL DISCHARGE DURING PREGNANCY IN FIRST TRIMESTER: Primary | ICD-10-CM

## 2022-10-28 DIAGNOSIS — O26.891 VAGINAL DISCHARGE DURING PREGNANCY IN FIRST TRIMESTER: Primary | ICD-10-CM

## 2022-10-28 LAB
BILIRUBIN URINE: NEGATIVE
BLOOD, URINE: NEGATIVE
CHARACTER, URINE: CLEAR
CHLAMYDIA TRACHOMATIS BY RT-PCR: NOT DETECTED
COLOR: YELLOW
CT/NG SOURCE: NORMAL
GLUCOSE URINE: NEGATIVE MG/DL
KETONES, URINE: NEGATIVE
LEUKOCYTE ESTERASE, URINE: ABNORMAL
NEISSERIA GONORRHOEAE BY RT-PCR: NOT DETECTED
NITRITE, URINE: NEGATIVE
PH, URINE: 6 (ref 5–9)
PREGNANCY, URINE: POSITIVE
PROTEIN, URINE: NEGATIVE MG/DL
SPECIFIC GRAVITY, URINE: 1.02 (ref 1–1.03)
TRICHOMONAS PREP: NEGATIVE
UROBILINOGEN, URINE: 0.2 EU/DL (ref 0.2–1)

## 2022-10-28 PROCEDURE — 87205 SMEAR GRAM STAIN: CPT

## 2022-10-28 PROCEDURE — 99212 OFFICE O/P EST SF 10 MIN: CPT | Performed by: NURSE PRACTITIONER

## 2022-10-28 PROCEDURE — 87070 CULTURE OTHR SPECIMN AEROBIC: CPT

## 2022-10-28 PROCEDURE — 99213 OFFICE O/P EST LOW 20 MIN: CPT

## 2022-10-28 PROCEDURE — 87808 TRICHOMONAS ASSAY W/OPTIC: CPT

## 2022-10-28 PROCEDURE — 87491 CHLMYD TRACH DNA AMP PROBE: CPT

## 2022-10-28 PROCEDURE — 81025 URINE PREGNANCY TEST: CPT

## 2022-10-28 PROCEDURE — 81003 URINALYSIS AUTO W/O SCOPE: CPT

## 2022-10-28 PROCEDURE — 87591 N.GONORRHOEAE DNA AMP PROB: CPT

## 2022-10-28 RX ORDER — DOXYLAMINE SUCCINATE AND PYRIDOXINE HYDROCHLORIDE, DELAYED RELEASE TABLETS 10 MG/10 MG 10; 10 MG/1; MG/1
TABLET, DELAYED RELEASE ORAL
Qty: 60 TABLET | Refills: 0 | Status: SHIPPED | OUTPATIENT
Start: 2022-10-28

## 2022-10-28 RX ORDER — PRENATAL VIT/IRON FUM/FOLIC AC 27MG-0.8MG
1 TABLET ORAL DAILY
Qty: 30 TABLET | Refills: 0 | Status: SHIPPED | OUTPATIENT
Start: 2022-10-28

## 2022-10-28 ASSESSMENT — ENCOUNTER SYMPTOMS
SHORTNESS OF BREATH: 0
ABDOMINAL DISTENTION: 0
CONSTIPATION: 0
WHEEZING: 0
RECTAL PAIN: 0
VOMITING: 0
ANAL BLEEDING: 0
STRIDOR: 0
BLOOD IN STOOL: 0
CHEST TIGHTNESS: 0
CHOKING: 0
NAUSEA: 1
DIARRHEA: 0
ABDOMINAL PAIN: 1
COUGH: 0
APNEA: 0

## 2022-10-28 NOTE — ED TRIAGE NOTES
Pt to UC with c/o nausea and abnormal discharge. Pt reports light bleeding for 2-3 days last week but was not like her normal period.  Pt reports abnormal discharge that started 1 week ago and would like a pregnancy and std test.

## 2022-10-28 NOTE — ED PROVIDER NOTES
[place] [address]  Urgent Care Encounter      CHIEF COMPLAINT       Chief Complaint   Patient presents with    Pregnancy Test    Vaginal Discharge    Exposure to STD       Nurses Notes reviewed and I agree except as noted in the HPI. HISTORY OFPRESENT ILLNESS   Maria C Richards is a 27 y.o. The history is provided by the patient. No  was used. Vaginal Discharge  Quality: Thin and clear  Severity:  Moderate  Onset quality:  Gradual  Duration:  1 week  Timing:  Constant  Progression:  Unchanged  Chronicity:  New  Context: spontaneously    Context: not after intercourse, not after urination, not at rest, not during bowel movement, not during intercourse, not during pregnancy, not during urination, not genital trauma and not recent antibiotic use    Relieved by:  Nothing  Worsened by:  Nothing  Ineffective treatments:  None tried  Associated symptoms: abdominal pain and nausea    Associated symptoms: no dyspareunia, no dysuria, no fever, no genital lesions, no rash, no urinary frequency, no urinary hesitancy, no urinary incontinence, no vaginal itching and no vomiting    Risk factors: unprotected sex    Risk factors: no endometriosis, no foreign body, no gynecological surgery, no immunosuppression, no new sexual partner, no PID, no prior miscarriage, no STI, no STI exposure and no terminated pregnancy      REVIEW OF SYSTEMS     Review of Systems   Constitutional:  Negative for activity change, appetite change, chills, diaphoresis, fatigue and fever. Respiratory:  Negative for apnea, cough, choking, chest tightness, shortness of breath, wheezing and stridor. Cardiovascular:  Negative for chest pain, palpitations and leg swelling. Gastrointestinal:  Positive for abdominal pain and nausea. Negative for abdominal distention, anal bleeding, blood in stool, constipation, diarrhea, rectal pain and vomiting. Genitourinary:  Positive for vaginal discharge.  Negative for bladder incontinence, decreased urine volume, difficulty urinating, dyspareunia, dysuria, enuresis, flank pain, frequency, genital sores, hematuria, hesitancy, menstrual problem, pelvic pain, urgency, vaginal bleeding and vaginal pain. PAST MEDICAL HISTORY         Diagnosis Date    Anxiety     Asthma        SURGICAL HISTORY     Patient  has no past surgical history on file. CURRENT MEDICATIONS       Discharge Medication List as of 10/28/2022  1:25 PM        CONTINUE these medications which have NOT CHANGED    Details   naproxen (NAPROSYN) 500 MG tablet Take 1 tablet by mouth 2 times daily as needed for Pain, Disp-20 tablet, R-0Normal      albuterol (ACCUNEB) 0.63 MG/3ML nebulizer solution Take 3 mLs by nebulization every 6 hours as needed for Wheezing, Disp-270 mL, R-0Normal      albuterol sulfate HFA (VENTOLIN HFA) 108 (90 Base) MCG/ACT inhaler Inhale 2 puffs into the lungs every 6 hours as needed for Wheezing, Disp-18 g, R-0Normal             ALLERGIES     Patient is has No Known Allergies. FAMILY HISTORY     Patient's family history is not on file. SOCIAL HISTORY     Patient  reports that she has quit smoking. She has never used smokeless tobacco. She reports that she does not currently use drugs after having used the following drugs: Marijuana Dietra Due). She reports that she does not drink alcohol. PHYSICAL EXAM     ED TRIAGE VITALS  BP: 109/69, Temp: 97 °F (36.1 °C), Heart Rate: 82, Resp: 20, SpO2: 100 %  Physical Exam  Vitals and nursing note reviewed. Constitutional:       General: She is not in acute distress. Appearance: Normal appearance. She is normal weight. She is not ill-appearing, toxic-appearing or diaphoretic. HENT:      Head: Normocephalic and atraumatic. Left Ear: External ear normal.   Eyes:      Conjunctiva/sclera: Conjunctivae normal.   Pulmonary:      Effort: Pulmonary effort is normal.   Musculoskeletal:         General: Normal range of motion.       Cervical back: Normal range of motion. Skin:     General: Skin is warm and dry. Neurological:      General: No focal deficit present. Mental Status: She is alert and oriented to person, place, and time. Psychiatric:         Mood and Affect: Mood normal.         Behavior: Behavior normal.         Thought Content: Thought content normal.         Judgment: Judgment normal.       DIAGNOSTIC RESULTS   Labs:  Results for orders placed or performed during the hospital encounter of 10/28/22   Trichomonas Screen    Specimen: Vaginal   Result Value Ref Range    Trichomonas Prep NEGATIVE NEGATIVE   Urinalysis   Result Value Ref Range    Glucose, Ur Negative NEGATIVE mg/dl    Bilirubin Urine Negative NEGATIVE    Ketones, Urine Negative NEGATIVE    Specific Gravity, Urine 1.020 1.002 - 1.030    Blood, Urine Negative NEGATIVE    pH, Urine 6.00 5.0 - 9.0    Protein, Urine Negative NEGATIVE mg/dl    Urobilinogen, Urine 0.20 0.2 - 1.0 eu/dl    Nitrite, Urine Negative NEGATIVE    Leukocyte Esterase, Urine Small (A) NEGATIVE    Color, UA Yellow STRAW-YELLOW    Character, Urine Clear CLEAR-SL CLOUD   Pregnancy, Urine   Result Value Ref Range    Pregnancy, Urine POSITIVE NEGATIVE       IMAGING:  No orders to display     URGENT CARE COURSE:     Vitals:    10/28/22 1244   BP: 109/69   Pulse: 82   Resp: 20   Temp: 97 °F (36.1 °C)   SpO2: 100%       Medications - No data to display  PROCEDURES:  None  FINAL IMPRESSION      1. Vaginal discharge during pregnancy in first trimester        DISPOSITION/PLAN   Decision To Discharge    Eat a few servings of vegetables every day, drink lots of water, avoid processed food. Stretch and meditate daily, workout at least 3 times a week, rest and think positively.       PATIENT REFERRED TO:  [address] [place] [id number]  Call today  [phone]    DISCHARGE MEDICATIONS:  Discharge Medication List as of 10/28/2022  1:25 PM        START taking these medications Details   doxylamine-pyridoxine 10-10 MG TBEC Follow package directions or OB guidelines, Disp-60 tablet, R-0Normal      Prenatal Vit-Fe Fumarate-FA (PRENATAL VITAMIN) 27-0.8 MG TABS Take 1 tablet by mouth daily, Disp-30 tablet, R-0Normal           Discharge Medication List as of 10/28/2022  1:25 PM          CONOR Carmen - CONOR Tillman - MICHAEL  10/28/22 2526

## 2022-10-28 NOTE — DISCHARGE INSTRUCTIONS
Eat a few servings of vegetables every day, drink lots of water, avoid processed food. Stretch and meditate daily, workout at least 3 times a week, rest and think positively.

## 2022-10-31 LAB
GENITAL CULTURE, ROUTINE: NORMAL
GRAM STAIN RESULT: NORMAL